# Patient Record
Sex: MALE | Race: WHITE | NOT HISPANIC OR LATINO | URBAN - METROPOLITAN AREA
[De-identification: names, ages, dates, MRNs, and addresses within clinical notes are randomized per-mention and may not be internally consistent; named-entity substitution may affect disease eponyms.]

---

## 2018-03-10 ENCOUNTER — EMERGENCY (EMERGENCY)
Facility: HOSPITAL | Age: 18
LOS: 0 days | Discharge: HOME | End: 2018-03-10
Attending: PEDIATRICS

## 2018-03-10 VITALS
TEMPERATURE: 99 F | OXYGEN SATURATION: 100 % | DIASTOLIC BLOOD PRESSURE: 67 MMHG | RESPIRATION RATE: 20 BRPM | SYSTOLIC BLOOD PRESSURE: 134 MMHG | HEART RATE: 102 BPM

## 2018-03-10 VITALS — WEIGHT: 132.28 LBS

## 2018-03-10 DIAGNOSIS — R51 HEADACHE: ICD-10-CM

## 2018-03-10 DIAGNOSIS — K04.7 PERIAPICAL ABSCESS WITHOUT SINUS: ICD-10-CM

## 2018-03-10 RX ORDER — IBUPROFEN 200 MG
600 TABLET ORAL ONCE
Qty: 0 | Refills: 0 | Status: COMPLETED | OUTPATIENT
Start: 2018-03-10 | End: 2018-03-10

## 2018-03-10 RX ORDER — AMOXICILLIN 250 MG/5ML
1 SUSPENSION, RECONSTITUTED, ORAL (ML) ORAL
Qty: 21 | Refills: 0 | OUTPATIENT
Start: 2018-03-10 | End: 2018-03-16

## 2018-03-10 RX ADMIN — Medication 600 MILLIGRAM(S): at 11:55

## 2018-03-10 NOTE — ED PROVIDER NOTE - PLAN OF CARE
pain control 18 yo male with left facial swelling, early stages of possible dental abscess. To complete abx as prescribed and f/u dental.

## 2018-03-10 NOTE — ED PROVIDER NOTE - NS ED ROS FT
Constitutional: No fever.  Pt eating and drinking well  Eyes: No discharge, erythema, pain, vision changes or edema.   ENMT: No ear pain. No Rhinorrhea. No congestion. No sore throat.   Cardiac: No CP or SOB  Respiratory: No cough, stridor, or respiratory distress. No WOB  GI: No nausea, vomiting, diarrhea or abdominal pain  Neuro: No headache or weakness. No LOC. No change in mental status   Skin: No skin rash.

## 2018-03-10 NOTE — ED PROVIDER NOTE - PROGRESS NOTE DETAILS
ATTENDING NOTE:   18 y/o M with no PMH presents to ED for evaluation of left sided dental pain that began yesterday, now worsening. No fever/chills, SOB. Denies trauma. Normal PO intake.  Physical Exam: VS reviewed. Pt is well appearing, in no distress. Answering all questions appropriately.  Sitting up in no obvious distress.  MMM, (+)Left lower gum swelling, no drainable fluid collection noted. Cap refill <2 seconds. No obvious skin rash noted. Chest with no retractions, no distress. Neuro exam grossly intact.  Will RX ABX and discharge home with outpatient dental follow up. Supportive care and return precautions advised. Pt and mother comfortable with plan.

## 2018-03-10 NOTE — ED PROVIDER NOTE - CARE PLAN
Principal Discharge DX:	Dental abscess  Goal:	pain control  Assessment and plan of treatment:	18 yo male with left facial swelling, early stages of possible dental abscess. To complete abx as prescribed and f/u dental.

## 2018-03-10 NOTE — ED PROVIDER NOTE - OBJECTIVE STATEMENT
18 yo male presents with L. facial swelling since yesterday. No associated fever. difficulty breathing, or difficulty swallowing, Reports gum feels like it is swollen. 18 yo male presents with L. facial swelling since yesterday. No associated fever. difficulty breathing, or difficulty swallowing, Reports gum feels like it is swollen. No hot/cold sensitivity. Last seen by dentist 8 months ago. Denies smoking or trauma,,

## 2018-05-11 ENCOUNTER — EMERGENCY (EMERGENCY)
Facility: HOSPITAL | Age: 18
LOS: 0 days | Discharge: HOME | End: 2018-05-12
Attending: EMERGENCY MEDICINE | Admitting: EMERGENCY MEDICINE

## 2018-05-11 VITALS
SYSTOLIC BLOOD PRESSURE: 156 MMHG | DIASTOLIC BLOOD PRESSURE: 73 MMHG | TEMPERATURE: 99 F | HEIGHT: 67 IN | RESPIRATION RATE: 20 BRPM | OXYGEN SATURATION: 100 % | HEART RATE: 100 BPM | WEIGHT: 134.92 LBS

## 2018-05-11 DIAGNOSIS — H57.8 OTHER SPECIFIED DISORDERS OF EYE AND ADNEXA: ICD-10-CM

## 2018-05-11 DIAGNOSIS — K08.89 OTHER SPECIFIED DISORDERS OF TEETH AND SUPPORTING STRUCTURES: ICD-10-CM

## 2018-05-11 DIAGNOSIS — K02.9 DENTAL CARIES, UNSPECIFIED: ICD-10-CM

## 2018-05-11 RX ORDER — DEXAMETHASONE 0.5 MG/5ML
10 ELIXIR ORAL ONCE
Qty: 0 | Refills: 0 | Status: COMPLETED | OUTPATIENT
Start: 2018-05-11 | End: 2018-05-11

## 2018-05-11 RX ORDER — IBUPROFEN 200 MG
600 TABLET ORAL ONCE
Qty: 0 | Refills: 0 | Status: COMPLETED | OUTPATIENT
Start: 2018-05-11 | End: 2018-05-11

## 2018-05-11 NOTE — ED ADULT TRIAGE NOTE - CHIEF COMPLAINT QUOTE
pt seen at urgent care today and prescribed abx, prescription wasn't ready when patient went to pharmacy so patient didn't receive abx, went home and called 911

## 2018-05-12 RX ORDER — AMOXICILLIN 250 MG/5ML
1 SUSPENSION, RECONSTITUTED, ORAL (ML) ORAL
Qty: 14 | Refills: 0 | OUTPATIENT
Start: 2018-05-12 | End: 2018-05-18

## 2018-05-12 RX ORDER — ONDANSETRON 8 MG/1
4 TABLET, FILM COATED ORAL ONCE
Qty: 0 | Refills: 0 | Status: COMPLETED | OUTPATIENT
Start: 2018-05-12 | End: 2018-05-12

## 2018-05-12 RX ADMIN — ONDANSETRON 4 MILLIGRAM(S): 8 TABLET, FILM COATED ORAL at 00:07

## 2018-05-12 RX ADMIN — Medication 10 MILLIGRAM(S): at 00:07

## 2018-05-12 RX ADMIN — Medication 600 MILLIGRAM(S): at 00:07

## 2018-05-12 NOTE — ED PROVIDER NOTE - NS ED ROS FT
Constitutional: (+) fevers/chills.    Head: No injury, headache.    Eyes:  (+) red watery eyes. No visual changes, eye pain or discharge. No injury.    ENMT:  (+) sore throat. No hearing changes, pain, discharge or infections. No neck pain or stiffness. No loss ROM.    Cardiac:  No chest pain, SOB or edema. No chest pain with exertion.    Respiratory:  No cough or respiratory distress.     GI:  No nausea, vomiting, diarrhea or abdominal pain. No anorexia. No change in PO intake.    :  No dysuria, frequency, urgency or burning. No change in urine output.    MS:  No myalgia, muscle weakness, joint pain or back pain. No loss ROM.    Neuro:  No dizziness or weakness.  No LOC.    Skin:  No skin rash.

## 2018-05-12 NOTE — ED PROVIDER NOTE - PHYSICAL EXAMINATION
GEN: Well appearing, in no apparent distress.    HEAD:  Normocephalic, atraumatic.    EYES:  Injected conjunctiva. No drainage or discharge.    ENMT:  Nasal mucosa moist; mouth moist without ulcerations or lesions; (+) tonsillar swelling L>R and erythema. No exudate, ulcerations or lesions.    NECK:  Supple, no masses. Normal ROM.    CARDIAC:  RRR, normal S1 and S2, no murmurs, rubs or gallops.    RESP:  Respiratory rate and effort appear normal; lungs are clear to auscultation bilaterally; no rhonchi, rales or wheezes.    ABDOMEN:  Soft, non-tender, non-distended, no masses. Normal BS throughout.    MUSCULOSKELETAL/NEURO:  AAO x 3. Motor 5/5. Sensory intact. Patient able to ambulate without difficulty.    SKIN:  Normal skin color for age and race, well-perfused; warm and dry.

## 2018-05-12 NOTE — ED PROVIDER NOTE - OBJECTIVE STATEMENT
16 yo male with no significant PMHx presents for sore throat. Patient was seen in urgent care and was prescribed antibiotics patient states script wasn't ready so he went home and called 911 and came here for prescription. (+) congestion, rhinorrhea, sore throat, fevers. Patient denies chest pain, SOB, abdominal pain, nausea, vomiting, diarrhea, constipation, dizziness, weakness, recent travel, changes in PO intake, changes in urine output, changes in mental status.

## 2018-05-12 NOTE — ED PROVIDER NOTE - ATTENDING CONTRIBUTION TO CARE
16 yo M presents with mother with c/o sore throat, fever and congestion.  Pt was seen at an [facility] earlier today and prescribed abx, however his prescription was not ready.  no CP, no SOB.  on exam pt in NAD AAO x3, + OP erythema with tonsillar hypertrophy L>R, uvula midline, + lad, Lungs CAT B/L, no rash

## 2018-05-13 ENCOUNTER — TRANSCRIPTION ENCOUNTER (OUTPATIENT)
Age: 18
End: 2018-05-13

## 2018-05-13 ENCOUNTER — INPATIENT (INPATIENT)
Facility: HOSPITAL | Age: 18
LOS: 0 days | Discharge: HOME | End: 2018-05-14
Attending: STUDENT IN AN ORGANIZED HEALTH CARE EDUCATION/TRAINING PROGRAM | Admitting: STUDENT IN AN ORGANIZED HEALTH CARE EDUCATION/TRAINING PROGRAM
Payer: MEDICAID

## 2018-05-13 VITALS
HEART RATE: 110 BPM | OXYGEN SATURATION: 100 % | HEIGHT: 67 IN | SYSTOLIC BLOOD PRESSURE: 130 MMHG | WEIGHT: 134.92 LBS | RESPIRATION RATE: 18 BRPM | DIASTOLIC BLOOD PRESSURE: 79 MMHG | TEMPERATURE: 99 F

## 2018-05-13 DIAGNOSIS — J36 PERITONSILLAR ABSCESS: ICD-10-CM

## 2018-05-13 LAB
ALBUMIN SERPL ELPH-MCNC: 3.7 G/DL — SIGNIFICANT CHANGE UP (ref 3.5–5.2)
ALP SERPL-CCNC: 66 U/L — SIGNIFICANT CHANGE UP (ref 30–115)
ALT FLD-CCNC: 11 U/L — LOW (ref 13–38)
ANION GAP SERPL CALC-SCNC: 12 MMOL/L — SIGNIFICANT CHANGE UP (ref 7–14)
AST SERPL-CCNC: 20 U/L — SIGNIFICANT CHANGE UP (ref 13–38)
BASOPHILS # BLD AUTO: 0.04 K/UL — SIGNIFICANT CHANGE UP (ref 0–0.2)
BASOPHILS NFR BLD AUTO: 0.2 % — SIGNIFICANT CHANGE UP (ref 0–1)
BILIRUB DIRECT SERPL-MCNC: <0.2 MG/DL — SIGNIFICANT CHANGE UP (ref 0–0.2)
BILIRUB INDIRECT FLD-MCNC: >0.4 MG/DL — SIGNIFICANT CHANGE UP (ref 0.2–1.2)
BILIRUB SERPL-MCNC: 0.6 MG/DL — SIGNIFICANT CHANGE UP (ref 0.2–1.2)
BUN SERPL-MCNC: 15 MG/DL — SIGNIFICANT CHANGE UP (ref 10–20)
CALCIUM SERPL-MCNC: 9 MG/DL — SIGNIFICANT CHANGE UP (ref 8.5–10.1)
CHLORIDE SERPL-SCNC: 103 MMOL/L — SIGNIFICANT CHANGE UP (ref 98–110)
CO2 SERPL-SCNC: 23 MMOL/L — SIGNIFICANT CHANGE UP (ref 17–32)
CREAT SERPL-MCNC: 0.9 MG/DL — SIGNIFICANT CHANGE UP (ref 0.3–1)
EOSINOPHIL # BLD AUTO: 0.02 K/UL — SIGNIFICANT CHANGE UP (ref 0–0.7)
EOSINOPHIL NFR BLD AUTO: 0.1 % — SIGNIFICANT CHANGE UP (ref 0–8)
GLUCOSE SERPL-MCNC: 109 MG/DL — HIGH (ref 70–99)
HCT VFR BLD CALC: 38.2 % — LOW (ref 42–52)
HGB BLD-MCNC: 12.5 G/DL — LOW (ref 14–18)
IMM GRANULOCYTES NFR BLD AUTO: 0.6 % — HIGH (ref 0.1–0.3)
LACTATE SERPL-SCNC: 1.1 MMOL/L — SIGNIFICANT CHANGE UP (ref 0.5–2.2)
LIDOCAIN IGE QN: 29 U/L — SIGNIFICANT CHANGE UP (ref 7–60)
LYMPHOCYTES # BLD AUTO: 1.1 K/UL — LOW (ref 1.2–3.4)
LYMPHOCYTES # BLD AUTO: 6.4 % — LOW (ref 20.5–51.1)
MCHC RBC-ENTMCNC: 27.2 PG — SIGNIFICANT CHANGE UP (ref 27–31)
MCHC RBC-ENTMCNC: 32.7 G/DL — SIGNIFICANT CHANGE UP (ref 32–37)
MCV RBC AUTO: 83.2 FL — SIGNIFICANT CHANGE UP (ref 80–94)
MONOCYTES # BLD AUTO: 1.04 K/UL — HIGH (ref 0.1–0.6)
MONOCYTES NFR BLD AUTO: 6 % — SIGNIFICANT CHANGE UP (ref 1.7–9.3)
NEUTROPHILS # BLD AUTO: 14.99 K/UL — HIGH (ref 1.4–6.5)
NEUTROPHILS NFR BLD AUTO: 86.7 % — HIGH (ref 42.2–75.2)
NRBC # BLD: 0 /100 WBCS — SIGNIFICANT CHANGE UP (ref 0–0)
PLATELET # BLD AUTO: 231 K/UL — SIGNIFICANT CHANGE UP (ref 130–400)
POTASSIUM SERPL-MCNC: 5.3 MMOL/L — HIGH (ref 3.5–5)
POTASSIUM SERPL-SCNC: 5.3 MMOL/L — HIGH (ref 3.5–5)
PROT SERPL-MCNC: 6.9 G/DL — SIGNIFICANT CHANGE UP (ref 6.1–8)
RBC # BLD: 4.59 M/UL — LOW (ref 4.7–6.1)
RBC # FLD: 13.1 % — SIGNIFICANT CHANGE UP (ref 11.5–14.5)
SODIUM SERPL-SCNC: 138 MMOL/L — SIGNIFICANT CHANGE UP (ref 135–146)
WBC # BLD: 17.3 K/UL — HIGH (ref 4.8–10.8)
WBC # FLD AUTO: 17.3 K/UL — HIGH (ref 4.8–10.8)

## 2018-05-13 RX ORDER — SODIUM CHLORIDE 9 MG/ML
1000 INJECTION, SOLUTION INTRAVENOUS
Qty: 0 | Refills: 0 | Status: DISCONTINUED | OUTPATIENT
Start: 2018-05-13 | End: 2018-05-14

## 2018-05-13 RX ORDER — SODIUM CHLORIDE 9 MG/ML
1000 INJECTION INTRAMUSCULAR; INTRAVENOUS; SUBCUTANEOUS ONCE
Qty: 0 | Refills: 0 | Status: COMPLETED | OUTPATIENT
Start: 2018-05-13 | End: 2018-05-13

## 2018-05-13 RX ORDER — SODIUM CHLORIDE 9 MG/ML
1000 INJECTION, SOLUTION INTRAVENOUS
Qty: 0 | Refills: 0 | Status: DISCONTINUED | OUTPATIENT
Start: 2018-05-13 | End: 2018-05-13

## 2018-05-13 RX ORDER — KETOROLAC TROMETHAMINE 30 MG/ML
30 SYRINGE (ML) INJECTION EVERY 6 HOURS
Qty: 0 | Refills: 0 | Status: DISCONTINUED | OUTPATIENT
Start: 2018-05-13 | End: 2018-05-14

## 2018-05-13 RX ORDER — KETOROLAC TROMETHAMINE 30 MG/ML
30 SYRINGE (ML) INJECTION EVERY 6 HOURS
Qty: 0 | Refills: 0 | Status: DISCONTINUED | OUTPATIENT
Start: 2018-05-13 | End: 2018-05-13

## 2018-05-13 RX ORDER — DEXAMETHASONE 0.5 MG/5ML
10 ELIXIR ORAL ONCE
Qty: 0 | Refills: 0 | Status: COMPLETED | OUTPATIENT
Start: 2018-05-13 | End: 2018-05-13

## 2018-05-13 RX ORDER — KETOROLAC TROMETHAMINE 30 MG/ML
30 SYRINGE (ML) INJECTION ONCE
Qty: 0 | Refills: 0 | Status: DISCONTINUED | OUTPATIENT
Start: 2018-05-13 | End: 2018-05-13

## 2018-05-13 RX ORDER — DEXAMETHASONE 0.5 MG/5ML
6 ELIXIR ORAL EVERY 12 HOURS
Qty: 0 | Refills: 0 | Status: DISCONTINUED | OUTPATIENT
Start: 2018-05-13 | End: 2018-05-14

## 2018-05-13 RX ORDER — SODIUM CHLORIDE 9 MG/ML
1000 INJECTION INTRAMUSCULAR; INTRAVENOUS; SUBCUTANEOUS
Qty: 0 | Refills: 0 | Status: DISCONTINUED | OUTPATIENT
Start: 2018-05-13 | End: 2018-05-13

## 2018-05-13 RX ORDER — SODIUM CHLORIDE 9 MG/ML
3 INJECTION INTRAMUSCULAR; INTRAVENOUS; SUBCUTANEOUS ONCE
Qty: 0 | Refills: 0 | Status: COMPLETED | OUTPATIENT
Start: 2018-05-13 | End: 2018-05-13

## 2018-05-13 RX ADMIN — Medication 66.66 MILLIGRAM(S): at 14:42

## 2018-05-13 RX ADMIN — Medication 30 MILLIGRAM(S): at 08:20

## 2018-05-13 RX ADMIN — Medication 30 MILLIGRAM(S): at 21:04

## 2018-05-13 RX ADMIN — Medication 6 MILLIGRAM(S): at 20:42

## 2018-05-13 RX ADMIN — Medication 66.66 MILLIGRAM(S): at 22:01

## 2018-05-13 RX ADMIN — Medication 30 MILLIGRAM(S): at 19:41

## 2018-05-13 RX ADMIN — Medication 10 MILLIGRAM(S): at 08:03

## 2018-05-13 RX ADMIN — SODIUM CHLORIDE 100 MILLILITER(S): 9 INJECTION, SOLUTION INTRAVENOUS at 22:01

## 2018-05-13 RX ADMIN — SODIUM CHLORIDE 3 MILLILITER(S): 9 INJECTION INTRAMUSCULAR; INTRAVENOUS; SUBCUTANEOUS at 07:45

## 2018-05-13 RX ADMIN — SODIUM CHLORIDE 125 MILLILITER(S): 9 INJECTION INTRAMUSCULAR; INTRAVENOUS; SUBCUTANEOUS at 07:46

## 2018-05-13 RX ADMIN — SODIUM CHLORIDE 1000 MILLILITER(S): 9 INJECTION INTRAMUSCULAR; INTRAVENOUS; SUBCUTANEOUS at 07:45

## 2018-05-13 RX ADMIN — Medication 30 MILLIGRAM(S): at 08:03

## 2018-05-13 NOTE — H&P PEDIATRIC - ATTENDING COMMENTS
17y M p/w sore throat, fever and odynophagia. Pt reports sore throat for 4 days, rapid strep was done at PMD office and was negative, but was treated with augmentin after returning for second visit, which he has taken for several days with no relief of symptoms. Pt had fever x 1 day that self resolved, but throat pain became severe. He has had no resp distress, no shortness of breath, no abdominal pain, rashes, he has had significant discomfort with swallowing. Pt reports he already feels much improved s/p ENT bedside drainage of 8cc purulent fluid, and after receiving fluids and  decadron . Pain now is able to take PO liquids and soft food. Pt has no prior h/o similar symptoms.    Vital Signs Last 24 Hrs  T(C): 36.1 (14 May 2018 08:14), Max: 37.1 (13 May 2018 15:30)  T(F): 96.9 (14 May 2018 08:14), Max: 98.7 (13 May 2018 15:30)  HR: 75 (14 May 2018 08:14) (75 - 90)  BP: 110/50 (14 May 2018 08:14) (104/52 - 117/56)  RR: 20 (14 May 2018 08:14) (20 - 20)  SpO2: 98% (14 May 2018 08:14) (97% - 100%)    PE: The Patient appears well , mildly uncomfortable with some hoarseness, alert and appropriate    Skin: warm and moist    Normocephalic, Perrla, sclera clear, no nasal discharge, moist mucous membranes, no sinus tenderness to palpation, post oropharynx erythematous with b/l  3+ tonsils L>R, very erythematous and with diffuses white exudates, uvula deviation toward right.    Neck supple, FROM, mobile rubbery ant cervical adenopathy of left side only    Lungs: CTA b/l, no tachypnea, no retractions, no wheeze, no crackles    Cor: RRR, S1 S2 wnl, no murmur    Abd: Soft, non tender, non distended, normal active bowel sounds, no mass, no HSM, spleen tip felt 1cm below ribcage, non tender    Ext: Warm, well perfused, moving all ext equally.                          12.5   17.30 )-----------( 231      ( 13 May 2018 09:47 )             38.2     Culture of fluid sent, pending    < from: CT Neck Soft Tissue w/ IV Cont (05.13.18 @ 08:41) >    Impression:     4.1 cm ill-defined hypodensity within the left palatine tonsil likely   represents early abscess/phlegmonous changes. No well-defined wall at   this time. Significant edema within the left submandibular and   paratracheal space as well as the piriform sinus.      Enlargement of the bilateral palatine tonsils consistent with tonsillitis.    Left cervical chain adenopathy, likely reactive.      < end of copied text >      Assessment and Plan:  Acute Pharyngitis/tonsillitis with Left peritonsilar abscess now s/p drainage and improving well. Bacterial vs viral/ mono- EBV, CMV.     -Trial of Po and discharge home later today if continues to do well today with adequate Po intake and pain controlled on oral motrin/Tylenol.    -IV clinda, transition to PO clinda upon discharge  -Encourage freq PO fluids    -f/u Monospot, EBV possibly sent from urgent care, send tests if those are not available .     -Follow up PMD and ENT as o/p

## 2018-05-13 NOTE — ED PROVIDER NOTE - PROGRESS NOTE DETAILS
CT read appreciated, d/w Dr Mccain, will see at [place]; pt will require admission for hydration/IV abx, is protecting airway and managing secretions, stable for floor

## 2018-05-13 NOTE — ED PROVIDER NOTE - NEUROLOGICAL, MLM
Alert and oriented, no focal deficits, no motor or sensory deficits. negative kerning and Brudzinski sign, neck supple

## 2018-05-13 NOTE — H&P PEDIATRIC - HEAD, EARS, EYES, NOSE AND THROAT
Left tonsillar hypertrophy with uvular deviation to the right, exam limited due to patient unable to open mouth wide.

## 2018-05-13 NOTE — CONSULT NOTE ADULT - ASSESSMENT
18 y/o male with 4.1 cm ill defined edematous area of the L palatine tonsil.  - IV Clindmycin  - Decadron 6mg Q12   - pain control  - IV fluid hydration  - possible drainage of abscess once trismus resolves  - d/w Dr. Pineda

## 2018-05-13 NOTE — CONSULT NOTE ADULT - SUBJECTIVE AND OBJECTIVE BOX
ENT DAILY PROGRESS NOTE    Overnight events/Interval HPI: HPI:  18 yo M w/no pmhx presents with throat pain and swelling, admitted for tonsilitis with concern for abscess formation.    Symptoms started 4 days ago with mild throat pain and swelling. Went to urgent care the next day, received ibuprofen,  and they did a throat culture. Later on that day pain got worse, started spiking fevers so went to [place] ED, where they gave him a steroid shot and started him on Augmentin. Following day, swelling and pain were progressively worse with trouble speaking, and was not taking anything PO resulting in decreased urine output so came to the ED.  Denies any trouble breathing, nausea, vomiting, diarrhea, neck pain, chest pain, shortness of breath,  or abdominal pain. (13 May 2018 15:49)      Seen and examined at bedside. Pt feeling well denies any pain but states he still is having trouble tolerating PO. Feels much better since receiving a dose of steroids. Has had voice changes. No fevers tmax 99, No N/V. Has trismus, tolerating secretions, no drooling, no trouble breathing/SOB      Allergies    No Known Allergies    Intolerances        MEDICATIONS:  Antiinfectives:   clindamycin IV Intermittent - Peds 600 milliGRAM(s) IV Intermittent every 8 hours    IV fluids:  dextrose 5% + sodium chloride 0.9%. - Pediatric 1000 milliLiter(s) IV Continuous <Continuous>    Hematologic/Anticoagulation:    Pain medications/Neuro:  ketorolac   Injectable 30 milliGRAM(s) IV Push every 6 hours PRN    Endocrine Medications:   dexamethasone  Injectable 6 milliGRAM(s) IV Push every 12 hours    All other standing medications:     All other PRN medications:      Vital Signs Last 24 Hrs  T(C): 37.1 (13 May 2018 15:30), Max: 37.3 (13 May 2018 06:51)  T(F): 98.7 (13 May 2018 15:30), Max: 99.2 (13 May 2018 06:51)  HR: 86 (13 May 2018 15:30) (78 - 110)  BP: 104/52 (13 May 2018 15:30) (104/52 - 130/79)  BP(mean): --  RR: 20 (13 May 2018 15:30) (16 - 20)  SpO2: 97% (13 May 2018 15:30) (97% - 100%)      05-13 @ 07:01  -  05-13 @ 17:41  --------------------------------------------------------  IN:    dextrose 5% + sodium chloride 0.9%. - Pediatric: 400 mL    Oral Fluid: 80 mL  Total IN: 480 mL    OUT:  Total OUT: 0 mL    Total NET: 480 mL            PHYSICAL EXAM:    ENT EXAM-   Constitutional: Well-developed, well-nourished.  + hoarseness.     Head:  normocephalic, atraumatic.   Nose:  No discharge, no bleed  OC/OP:  + trismus, mucosa moist, + L palatine swelling, + slight deviation of uvula to the R, No FOB tenderness, No active drainage,        LABS:  CBC-                        12.5   17.30 )-----------( 231      ( 13 May 2018 09:47 )             38.2     BMP/CMP-  13 May 2018 09:47    138    |  103    |  15     ----------------------------<  109    5.3     |  23     |  0.9      Ca    9.0        13 May 2018 09:47    TPro  6.9    /  Alb  3.7    /  TBili  0.6    /  DBili  <0.2   /  AST  20     /  ALT  11     /  AlkPhos  66     13 May 2018 09:47    Coagulation Studies-    Endocrine Panel-  Calcium, Total Serum: 9.0 mg/dL (05-13 @ 09:47)              RADIOLOGY & ADDITIONAL STUDIES:  < from: CT Neck Soft Tissue w/ IV Cont (05.13.18 @ 08:41) >    EXAM:  CT NECK SOFT TISSUE IC            PROCEDURE DATE:  05/13/2018            INTERPRETATION:  Clinical History / Reason for exam: Sore throat.    Technique: Multiple contiguous axial images were obtained of the soft   tissues of the neck from the superior aspect of the frontal sinuses   through to the carmen after the intravenous administration of  100 cc of   Optiray 320.    Comparison: None available.    Findings:     There is enlargement of the bilateral palatine tonsils, left greater than   right. The left palatine tonsil contains ill-defined hypodensity   measuring approximately 2.1 x 2.1 x 4.1 cm. Enlarged left cervical chain   lymph nodes measuring up to 3.7 x 1.9 cm (series 602/53).    Adjacent edema is seen within the left submandibular and paratracheal   space. There is edema extending down the left side of the oropharynx to   the left aryepiglottic fold and perform sinus. No retropharyngeal edema   is seen.    The remainder of the upper aerodigestive system is unremarkable.    Visualized portions of the brain demonstrate no abnormal enhancing masses   or lesions. The paranasal sinuses and bilateral mastoid air complexes are   clear.      Globes and lenses are intact.      The thyroid gland is symmetric and enhances homogeneously.  The great   vessels of the neck are unremarkable. There is no cervical   lymphadenopathy.  The bones of the cervical spine are unremarkable.      Bilateral upper lungs are clear.      Impression:     4.1 cm ill-defined hypodensity within the left palatine tonsil likely   represents early abscess/phlegmonous changes. No well-defined wall at   this time. Significant edema within the left submandibular and   paratracheal space as well as the piriform sinus.    Enlargement of the bilateral palatine tonsils consistent with tonsillitis.    Left cervical chain adenopathy, likely reactive.              ZINA SUE M.D., RESIDENT RADIOLOGIST  This document has been electronically signed.  JARRELL POSADAS M.D., ATTENDING RADIOLOGIST  This document has been electronically signed. May 13 2018  9:16AM                < end of copied text >

## 2018-05-13 NOTE — PROGRESS NOTE ADULT - SUBJECTIVE AND OBJECTIVE BOX
pt s/p needle aspiration of left peritonsilar abscess    pt is a 17 year old male admitted for left side peritonsillar abscess    after 12 hours of decadron, trismus diminished left peritonsilar region grossly distended wit uvula deviated to right    after discussion pt willing to allow needle aspiration    a single stick of 18 gauge needle aspirated 6-7 cc of white pus    pus sent to lab for c/s    pt tolerated aspiration with out issue

## 2018-05-13 NOTE — ED ADULT TRIAGE NOTE - CHIEF COMPLAINT QUOTE
Per mom, "He can't eat or drink for 3 days because his throat feels like it's closed up.  He needs fluids.  I think he's dehydrated"  Was treated in ED day before and was given a steroid and is now on antibiotics.

## 2018-05-13 NOTE — H&P PEDIATRIC - HISTORY OF PRESENT ILLNESS
18 yo M w/no pmhx presents with throat pain and swelling, admitted for tonsilitis with concern for abscess formation.    Symptoms started 4 days ago with mild throat pain and swelling. Went to urgent care the next day, received ibuprofen,  and they did a throat culture. Later on that day pain got worse, started spiking fevers so went to [place] ED, where they gave him a steroid shot and started him on Augmentin. Following day, swelling and pain were progressively worse with trouble speaking, and was not taking anything PO resulting in decreased urine output so came to the ED.  Denies any trouble breathing, nausea, vomiting, diarrhea, neck pain, chest pain, shortness of breath,  or abdominal pain.

## 2018-05-13 NOTE — ED PROVIDER NOTE - ATTENDING CONTRIBUTION TO CARE
I personally evaluated the patient. I reviewed the Resident’s or Physician Assistant’s note (as assigned above), and agree with the findings and plan except as documented in my note.  17y male immunized with worsening sore throat/unable to swallow, no sob, on exam vital signs appreciated, nontoxic, voice sl muffled, no hoarseness, no drooling, +mild trismus with edema and to left soft palate and tonsil with uvular deviation, +exudate, +tender left upper cervical deanna, imaging reviewied, will admit for IV abx, hydration, ENT eval

## 2018-05-13 NOTE — H&P PEDIATRIC - PROBLEM SELECTOR PLAN 1
- IV Clindamycin  - IV Decadron Q12H  - IV Toradol PRN pain  - Full liquid diet  - Maintenance IV fluids  - ENT may attempt drainage, will follow up

## 2018-05-13 NOTE — H&P PEDIATRIC - NSHPSOCIALHISTORY_GEN_ALL_CORE
Lives at home with mom, mom's boyfriend and brother. Graduated high school, is working at [employer] improvement, no smoking, drinks couple beers or a glass of wine on occasion, no drugs, is sexually active with girlfriend and uses condoms consistently, no history of STDs. No anxiety, sadness, depression.

## 2018-05-14 ENCOUNTER — TRANSCRIPTION ENCOUNTER (OUTPATIENT)
Age: 18
End: 2018-05-14

## 2018-05-14 VITALS — TEMPERATURE: 98 F

## 2018-05-14 PROBLEM — J30.2 OTHER SEASONAL ALLERGIC RHINITIS: Chronic | Status: ACTIVE | Noted: 2018-05-13

## 2018-05-14 PROBLEM — Z00.00 ENCOUNTER FOR PREVENTIVE HEALTH EXAMINATION: Status: ACTIVE | Noted: 2018-05-14

## 2018-05-14 PROCEDURE — 99222 1ST HOSP IP/OBS MODERATE 55: CPT

## 2018-05-14 RX ORDER — IBUPROFEN 200 MG
1 TABLET ORAL
Qty: 56 | Refills: 0 | OUTPATIENT
Start: 2018-05-14 | End: 2018-05-27

## 2018-05-14 RX ADMIN — Medication 6 MILLIGRAM(S): at 07:46

## 2018-05-14 RX ADMIN — Medication 66.66 MILLIGRAM(S): at 06:19

## 2018-05-14 NOTE — DISCHARGE NOTE PEDIATRIC - ADDITIONAL INSTRUCTIONS
F/U PMD in 2-3 days post discharge, please call for appointment. If he develops worsening pain, swelling, inability to turn his neck, inability to swallow, difficulty breathing, fever, or any new or worsening symptoms then please seek medical attention. F/U ENT within 1 week post discharge, please call for appointment. F/U dentist as soon as possible. If he develops worsening pain, swelling, inability to turn his neck, inability to swallow, difficulty breathing, fever, or any new or worsening symptoms then please seek medical attention.

## 2018-05-14 NOTE — PROGRESS NOTE ADULT - SUBJECTIVE AND OBJECTIVE BOX
Pt is a 17 y.o male with L tonsillar/PTA, s/p drainage at bedside - seen and examined, doing well. PT is tolerating PO diet this AM, feeling overall improved. Pt still c/o pain and difficulty with swallowing, but much better after being drained. Pt denies SOB/diff breathing.    MEDICATIONS  (STANDING):  clindamycin IV Intermittent - Peds 600 milliGRAM(s) IV Intermittent every 8 hours  dextrose 5% + sodium chloride 0.9%. - Pediatric 1000 milliLiter(s) (100 mL/Hr) IV Continuous <Continuous>    Vital Signs: T(F): 96.9 (14 May 2018 08:14), Max: 98.7 (13 May 2018 15:30), HR: 75, BP: 110/50, RR: 20, SpO2: 98%  GEN: NAD, awake and alert  HEENT: NC/AT, oral mucosa pink, + erythema/edema to L tonsil, uvula midline.                          12.5   17.30 )-----------( 231      ( 13 May 2018 09:47 )             38.2

## 2018-05-14 NOTE — DISCHARGE NOTE PEDIATRIC - MEDICATION SUMMARY - MEDICATIONS TO TAKE
I will START or STAY ON the medications listed below when I get home from the hospital:    ibuprofen 400 mg oral tablet  -- 1 tab(s) by mouth every 6 hours MDD:Take every 6 hours as needed for pain or fever.  -- Do not take this drug if you are pregnant.  It is very important that you take or use this exactly as directed.  Do not skip doses or discontinue unless directed by your doctor.  May cause drowsiness or dizziness.  Obtain medical advice before taking any non-prescription drugs as some may affect the action of this medication.  Take with food or milk.    -- Indication: For Mild pain    clindamycin 300 mg oral capsule  -- 1 cap(s) by mouth every 8 hours   -- Finish all this medication unless otherwise directed by prescriber.  Medication should be taken with plenty of water.    -- Indication: For PERITONSILLAR ABSCESS I will START or STAY ON the medications listed below when I get home from the hospital:    ibuprofen 400 mg oral tablet  -- 1 tab(s) by mouth every 6 hours MDD:Take every 6 hours as needed for pain or fever.  -- Do not take this drug if you are pregnant.  It is very important that you take or use this exactly as directed.  Do not skip doses or discontinue unless directed by your doctor.  May cause drowsiness or dizziness.  Obtain medical advice before taking any non-prescription drugs as some may affect the action of this medication.  Take with food or milk.    -- Indication: For PERITONSILLAR ABSCESS    clindamycin 300 mg oral capsule  -- 1 cap(s) by mouth every 8 hours   -- Finish all this medication unless otherwise directed by prescriber.  Medication should be taken with plenty of water.    -- Indication: For PERITONSILLAR ABSCESS

## 2018-05-14 NOTE — DISCHARGE NOTE PEDIATRIC - CARE PLAN
Principal Discharge DX:	Peritonsillar abscess  Goal:	Resolution of pain and swelling, ability to take adequate PO  Assessment and plan of treatment:	- Continue clindamycin for a total of 10 days  - Tylenol/Motrin as needed  - F/U PMD in 2-3 days post discharge, please call for appointment Principal Discharge DX:	Peritonsillar abscess  Goal:	Resolution of pain and swelling, ability to take adequate PO  Assessment and plan of treatment:	- Continue clindamycin for 8 more days  - Tylenol/Motrin as needed  - F/U ENT within 1 week after discharge, please call for appointment, information provided below  Secondary Diagnosis:	Dental infection  Goal:	Dentist follow up  Assessment and plan of treatment:	- Please follow up with dentist as soon as possible.

## 2018-05-14 NOTE — PROGRESS NOTE ADULT - ASSESSMENT
17 y.o male with L PTA/tonsillitis.    ·	D/C steroids  ·	cont abx, switch to PO when ready for D/C  ·	cont soft diet  ·	cont IVF  ·	dr marrufo at bedside

## 2018-05-14 NOTE — DISCHARGE NOTE PEDIATRIC - OTHER SIGNIFICANT FINDINGS
Nucleated RBC (05.13.18 @ 09:47)    Nucleated RBC: 0 /100 WBCs    Lipase, Serum (05.13.18 @ 09:47)    Lipase, Serum: 29 U/L    Lactate, Blood (05.13.18 @ 09:47)    Lactate, Blood: 1.1 mmol/L    Complete Blood Count + Automated Diff (05.13.18 @ 09:47)    WBC Count: 17.30 K/uL    RBC Count: 4.59 M/uL    Hemoglobin: 12.5 g/dL    Hematocrit: 38.2 %    Mean Cell Volume: 83.2 fL    Mean Cell Hemoglobin: 27.2 pg    Mean Cell Hemoglobin Conc: 32.7 g/dL    Red Cell Distrib Width: 13.1 %    Platelet Count - Automated: 231 K/uL    Auto Neutrophil #: 14.99 K/uL    Auto Lymphocyte #: 1.10 K/uL    Auto Monocyte #: 1.04 K/uL    Auto Eosinophil #: 0.02 K/uL    Auto Basophil #: 0.04 K/uL    Auto Neutrophil %: 86.7: Differential percentages must be correlated with absolute numbers for  clinical significance. %    Auto Lymphocyte %: 6.4 %    Auto Monocyte %: 6.0 %    Auto Eosinophil %: 0.1 %    Auto Basophil %: 0.2 %    Auto Immature Granulocyte %: 0.6 %    Hepatic Function Panel (05.13.18 @ 09:47)    Indirect Reacting Bilirubin: >0.4 mg/dL    Protein Total, Serum: 6.9 g/dL    Albumin, Serum: 3.7 g/dL    Bilirubin Total, Serum: 0.6 mg/dL    Bilirubin Direct, Serum: <0.2: Hemolyzed. Interpret with caution mg/dL    Alkaline Phosphatase, Serum: 66 U/L    Aspartate Aminotransferase (AST/SGOT): 20: Hemolyzed. Interpret with caution U/L    Alanine Aminotransferase (ALT/SGPT): 11: Hemolyzed. Interpret with caution U/L    Basic Metabolic Panel (05.13.18 @ 09:47)    Sodium, Serum: 138 mmol/L    Potassium, Serum: 5.3: Hemolyzed. Interpret with caution mmol/L    Chloride, Serum: 103 mmol/L    Carbon Dioxide, Serum: 23 mmol/L    Anion Gap, Serum: 12 mmol/L    Blood Urea Nitrogen, Serum: 15 mg/dL    Creatinine, Serum: 0.9 mg/dL    Glucose, Serum: 109 mg/dL    Calcium, Total Serum: 9.0 mg/dL    eGFR if Non : 123: Interpretative comment  The units for eGFR are ml/min/1.73m2 (normalized body surface area). The  eGFR is calculated from a serum creatinine using the CKD-EPI equation.  Other variables required for calculation are race, age and sex. Among  patients with chronic kidney disease (CKD), the eGFR is useful in  determining the stage of disease according to KDOQI CKD classification.  All eGFR results are reported numerically with the following  interpretation.          GFR                    With                 Without     (ml/min/1.73 m2)    Kidney Damage       Kidney Damage        >= 90                    Stage 1                     Normal        60-89                    Stage 2                     Decreased GFR        30-59     Stage 3                     Stage 3        15-29                    Stage 4                     Stage 4        < 15                      Stage 5                     Stage 5  Each stage of CKD assumes that the associated GFR level has been in  effect for at least 3 months. Determination of stages one and two (with  eGFR > 59 ml/min/m2) requires estimation of kidney damage for at least 3  months as defined by structural or functional abnormalities.  Limitations: All estimates of GFR will be less accurate for patients at  extremes of muscle mass (including but not limited to frail elderly,  critically ill, or cancer patients), those with unusual diets, and those  with conditions associated with reduced secretion or extrarenal  elimination of creatinine. The eGFR equation is not recommended for use  in patients with unstable creatinine levels. mL/min/1.73M2    eGFR if African American: 143 mL/min/1.73M2

## 2018-05-14 NOTE — DISCHARGE NOTE PEDIATRIC - PATIENT PORTAL LINK FT
You can access the [facility], offered by [facility], by registering with the following website: [email or web address]

## 2018-05-14 NOTE — DISCHARGE NOTE PEDIATRIC - PLAN OF CARE
Resolution of pain and swelling, ability to take adequate PO - Continue clindamycin for a total of 10 days  - Tylenol/Motrin as needed  - F/U PMD in 2-3 days post discharge, please call for appointment - Continue clindamycin for 8 more days  - Tylenol/Motrin as needed  - F/U ENT within 1 week after discharge, please call for appointment, information provided below Dentist follow up - Please follow up with dentist as soon as possible.

## 2018-05-14 NOTE — DISCHARGE NOTE PEDIATRIC - HOSPITAL COURSE
18 yo M w/no pmhx presents with throat pain and swelling, admitted for tonsilitis with concern for abscess formation.    Symptoms started 4 days ago with mild throat pain and swelling. Went to urgent care the next day, received ibuprofen,  and they did a throat culture. Later on that day pain got worse, started spiking fevers so went to [place] ED, where they gave him a steroid shot and started him on Augmentin. Following day, swelling and pain were progressively worse with trouble speaking, and was not taking anything PO resulting in decreased urine output so came to the ED. Does have an infected wisdom tooth on the left side.  Denies any trouble breathing, nausea, vomiting, diarrhea, neck pain, chest pain, shortness of breath,  or abdominal pain.    All other histories noncontributory.    PE: Left tonsillar hypertrophy with uvular deviation towards right side, unable to fully open mouth.    ED: T 99.2F, , /79, RR 18, O2 100%. CBC, CMP, CT Neck soft tissue. Dexamethasone x 1, Toradol x 1, NS Bolus x 1    Hospital course: Patient admitted to the floor, started on IV clindamycin, IV decadron and Toradol as needed. ENT drained 8cc of purulent fluid from the left tonsil, after which pain and swelling have improved. He is tolerating PO, and is stable for discharge home on oral medication. 16 yo M w/no pmhx presents with throat pain and swelling, admitted for tonsilitis with concern for abscess formation.    Symptoms started 4 days ago with mild throat pain and swelling. Went to urgent care the next day, received ibuprofen,  and they did a throat culture. Later on that day pain got worse, started spiking fevers so went to [place] ED, where they gave him a steroid shot and started him on Augmentin. Following day, swelling and pain were progressively worse with trouble speaking, and was not taking anything PO resulting in decreased urine output so came to the ED. Does have an infected wisdom tooth on the left side.  Denies any trouble breathing, nausea, vomiting, diarrhea, neck pain, chest pain, shortness of breath,  or abdominal pain.    All other histories noncontributory.    PE: Left tonsillar hypertrophy with uvular deviation towards right side, unable to fully open mouth.    ED: T 99.2F, , /79, RR 18, O2 100%. CBC, CMP, CT Neck soft tissue. Dexamethasone x 1, Toradol x 1, NS Bolus x 1    Hospital course: Patient admitted to the floor, started on IV clindamycin, IV decadron and Toradol as needed. ENT drained 8cc of purulent fluid from the left tonsil, after which pain and swelling have improved. He is tolerating PO, and is stable for discharge home on oral clindamycin and ibuprofen. Monospot and EBV serology sent, will follow up and call if positive.

## 2018-05-14 NOTE — DISCHARGE NOTE PEDIATRIC - CARE PROVIDER_API CALL
Johana Lucero (DO), Family Medicine  [address] [place] [address]  Phone: [phone]  Fax: [phone] Johana Lucero (DO), Family Medicine  [address] [place] [id number]  Phone: [phone]  Fax: [phone]    Dariel Pineda), Otolaryngology  [address] [place] [address]  Phone: [phone]  Fax: [phone]

## 2018-05-14 NOTE — DISCHARGE NOTE PEDIATRIC - CARE PROVIDERS DIRECT ADDRESSES
,DirectAddress_Unknown ,DirectAddress_Unknown,[email or web address]

## 2018-05-15 LAB
CULTURE RESULTS: SIGNIFICANT CHANGE UP
EBV EA AB SER IA-ACNC: <5 U/ML — SIGNIFICANT CHANGE UP
EBV EA AB TITR SER IF: NEGATIVE — SIGNIFICANT CHANGE UP
EBV EA IGG SER-ACNC: NEGATIVE — SIGNIFICANT CHANGE UP
EBV NA IGG SER IA-ACNC: <3 U/ML — SIGNIFICANT CHANGE UP
EBV PATRN SPEC IB-IMP: SIGNIFICANT CHANGE UP
EBV VCA IGG AVIDITY SER QL IA: NEGATIVE — SIGNIFICANT CHANGE UP
EBV VCA IGM SER IA-ACNC: 13.3 U/ML — SIGNIFICANT CHANGE UP
EBV VCA IGM SER IA-ACNC: <10 U/ML — SIGNIFICANT CHANGE UP
EBV VCA IGM TITR FLD: NEGATIVE — SIGNIFICANT CHANGE UP
HETEROPH AB TITR SER AGGL: NEGATIVE — SIGNIFICANT CHANGE UP
SPECIMEN SOURCE: SIGNIFICANT CHANGE UP

## 2018-05-17 ENCOUNTER — APPOINTMENT (OUTPATIENT)
Dept: OTOLARYNGOLOGY | Facility: CLINIC | Age: 18
End: 2018-05-17
Payer: MEDICAID

## 2018-05-17 VITALS
DIASTOLIC BLOOD PRESSURE: 70 MMHG | BODY MASS INDEX: 21.19 KG/M2 | WEIGHT: 135 LBS | HEIGHT: 67 IN | SYSTOLIC BLOOD PRESSURE: 120 MMHG

## 2018-05-17 DIAGNOSIS — Z87.09 PERSONAL HISTORY OF OTHER DISEASES OF THE RESPIRATORY SYSTEM: ICD-10-CM

## 2018-05-17 DIAGNOSIS — Z78.9 OTHER SPECIFIED HEALTH STATUS: ICD-10-CM

## 2018-05-17 PROCEDURE — 99212 OFFICE O/P EST SF 10 MIN: CPT

## 2018-05-17 RX ORDER — CLINDAMYCIN HYDROCHLORIDE 300 MG/1
CAPSULE ORAL
Refills: 0 | Status: ACTIVE | COMMUNITY

## 2018-05-19 DIAGNOSIS — J03.90 ACUTE TONSILLITIS, UNSPECIFIED: ICD-10-CM

## 2018-05-19 DIAGNOSIS — K04.7 PERIAPICAL ABSCESS WITHOUT SINUS: ICD-10-CM

## 2018-05-19 LAB
CULTURE RESULTS: SIGNIFICANT CHANGE UP
SPECIMEN SOURCE: SIGNIFICANT CHANGE UP

## 2018-06-17 NOTE — CHART NOTE - NSCHARTNOTEFT_GEN_A_CORE
18 y/o male with h/o dental carries presents with throat swelling x 4days. Pt reports feeling as if throat was closing, evaluated by Urgent Care 3 days ago who gave Motrin, rapid strep negative and sent home. Symptoms worsened that evening so went to [facility] ED Past Medical History:   Diagnosis Date    Diabetes mellitus     Hypertension        Past Surgical History:   Procedure Laterality Date    CHOLECYSTECTOMY      HERNIA REPAIR         Review of patient's allergies indicates:  No Known Allergies    Current Facility-Administered Medications on File Prior to Encounter   Medication    [COMPLETED] GI cocktail (mylanta 30 mL, lidocaine 2 % viscous 10 mL, dicyclomine 10 mL) 50 mL    [DISCONTINUED] amLODIPine tablet 10 mg    [DISCONTINUED] atropine 1 mg/ml injection 0.5 mg    [DISCONTINUED] hydroCHLOROthiazide tablet 25 mg    [DISCONTINUED] hydrOXYzine HCl tablet 25 mg    [DISCONTINUED] ketorolac tablet 10 mg    [DISCONTINUED] lisinopril tablet 40 mg    [DISCONTINUED] magnesium sulfate 3 g in sodium chloride 0.9% 250 mL IVPB    [DISCONTINUED] meclizine tablet 12.5 mg    [DISCONTINUED] ondansetron injection 4 mg    [DISCONTINUED] pantoprazole EC tablet 40 mg     Current Outpatient Prescriptions on File Prior to Encounter   Medication Sig    amlodipine (NORVASC) 10 MG tablet Take 1 tablet (10 mg total) by mouth once daily.    hydrochlorothiazide (HYDRODIURIL) 25 MG tablet     hydrOXYzine (ATARAX) 25 MG tablet Take 25 mg by mouth 3 (three) times daily as needed for Itching.    lisinopril (PRINIVIL,ZESTRIL) 40 MG tablet Take 40 mg by mouth once daily.     Family History     None        Social History Main Topics    Smoking status: Never Smoker    Smokeless tobacco: Never Used    Alcohol use No    Drug use: No    Sexual activity: No     Review of Systems   All other systems reviewed and are negative.    Objective:     Vital Signs (Most Recent):  Temp: 98 °F (36.7 °C) (06/17/18 1216)  Pulse: (!) 50 (06/17/18 1216)  Resp: 16 (06/17/18 1216)  BP: (!) 126/58 (06/17/18 1216)  SpO2: 95 % (06/17/18 1216) Vital Signs (24h Range):  Temp:  [97.9 °F (36.6 °C)-98.3 °F (36.8 °C)] 98 °F (36.7 °C)  Pulse:  [40-59] 50  Resp:  [12-20] 16  SpO2:  [91 %-98 %] 95 %  BP:  (114-177)/(58-86) 126/58     Weight: 71.2 kg (156 lb 14.4 oz)  Body mass index is 35.18 kg/m².    SpO2: 95 %  O2 Device (Oxygen Therapy): room air      Intake/Output Summary (Last 24 hours) at 06/17/18 1216  Last data filed at 06/17/18 0742   Gross per 24 hour   Intake              240 ml   Output             1201 ml   Net             -961 ml       Lines/Drains/Airways     Peripheral Intravenous Line                 Peripheral IV - Single Lumen 06/15/18 1951 Right Antecubital 1 day                Physical Exam   Constitutional: She is oriented to person, place, and time. She appears well-developed and well-nourished.   HENT:   Head: Normocephalic and atraumatic.   Eyes: No scleral icterus.   Cardiovascular: Regular rhythm.    Bradycardia   Systolic murmur at the left sternal border    Pulmonary/Chest: Effort normal and breath sounds normal. No respiratory distress. She has no wheezes. She has no rales.   Musculoskeletal: She exhibits no edema.   Neurological: She is alert and oriented to person, place, and time.   Skin: Skin is warm.   Psychiatric: She has a normal mood and affect.       Significant Labs: All pertinent lab results from the last 24 hours have been reviewed.    Significant Imaging: EKG: As in HPI   16 y/o male with h/o dental infection to L. wisdom tooth presents with throat swelling x 4days. Pt reports feeling as if throat was closing, evaluated by Urgent Care 3 days ago who gave Motrin, rapid strep negative and sent home. Symptoms worsened that evening so went to [facility] ED where he was given Decadron and started on Augmentin. Symptoms improved the following day, however on day of presentation, patient reports worsening throat swelling with change in voice, difficulty tolerating feeds and secondary weakness and headaches. No SOB, drooling, or stridor. No known trauma. Given decadron, NS bolus, and started on maintenance IVF in [facility] ED. ENT consulted, recommending admission for IV abx, steroids, and possible drainage.   Pt is otherwise well. Has seasonal allergies. No pmhx or surgeries. Denies smoking or drug use.       Constitutional: No fever.  decreased PO  Eyes: No discharge, erythema, pain, vision changes or edema.   ENMT: No ear pain. No Rhinorrhea. No congestion. +sore throat.   Cardiac: No CP or SOB  Respiratory: No cough, stridor, or respiratory distress. No WOB  GI: No nausea, vomiting, diarrhea or abdominal pain  : No foul smelling urine. No dysuria. Decreased UO.   MS: No muscle weakness, myalgia, joint pain, back pain  Neuro: +headache +weakness. No LOC. No change in mental status   Skin: No skin rash.    GEN: well-appearing, NAD  Head normocephalic, atraumatic. PERRL. No eye discharge. conjunctiva and sclera clear. No nasal congestion/discharge. MMM. Posterior pharyngeal exam limited secondary to patient being unable to open mouth completely. Notable uvula deviation to the right with left tonsillar hypertrophy. Neck supple, + cervical adenopathy. Heart: S1S2 RRR. Lungs- CTA B/L, good air entry. Abdomen soft ntnd +BS. Extremities- moves all normally, sensation wnl, no cyanosis Skin: warm and dry, no acute rash. cap refill < 2sec    CBC Full  -  ( 13 May 2018 09:47 )  WBC Count : 17.30 K/uL  Hemoglobin : 12.5 g/dL  Hematocrit : 38.2 %  Platelet Count - Automated : 231 K/uL  Mean Cell Volume : 83.2 fL  Mean Cell Hemoglobin : 27.2 pg  Mean Cell Hemoglobin Concentration : 32.7 g/dL  Auto Neutrophil # : 14.99 K/uL  Auto Lymphocyte # : 1.10 K/uL  Auto Monocyte # : 1.04 K/uL  Auto Eosinophil # : 0.02 K/uL  Auto Basophil # : 0.04 K/uL  Auto Neutrophil % : 86.7 %  Auto Lymphocyte % : 6.4 %  Auto Monocyte % : 6.0 %  Auto Eosinophil % : 0.1 %  Auto Basophil % : 0.2 %    05-13    138  |  103  |  15  ----------------------------<  109<H>  5.3<H>   |  23  |  0.9    Ca    9.0      13 May 2018 09:47    TPro  6.9  /  Alb  3.7  /  TBili  0.6  /  DBili  <0.2  /  AST  20  /  ALT  11<L>  /  AlkPhos  66  05-13    Lipase, Serum: 29 U/L (05.13.18 @ 09:47)  Lactate, Blood: 1.1 mmol/L (05.13.18 @ 09:47)  < from: CT Neck Soft Tissue w/ IV Cont (05.13.18 @ 08:41) >    4.1 cm ill-defined hypodensity within the left palatine tonsil likely   represents early abscess/phlegmonous changes. No well-defined wall at   this time. Significant edema within the left submandibular and   paratracheal space as well as the piriform sinus.    Enlargement of the bilateral palatine tonsils consistent with tonsillitis.    Left cervical chain adenopathy, likely reactive.    A/P: 16 y/o male presents with pharyngeal edema with resultant decreased PO likely in the setting of tonsillitis with early abscess formation.  - IV clindamycin  - Decadron Q12   - Toradol IV PRN pain  - MIVF  - Regular diet as tolerated  - Appreciate ENT recommendations- will continue to follow for possible I&D 16 y/o male with h/o dental infection to L. wisdom tooth presents with throat swelling x 4days. Pt reports feeling as if throat was closing, evaluated by Urgent Care 3 days ago who gave Motrin, rapid strep negative and sent home. Symptoms worsened that evening so went to [facility] ED where he was given Decadron and started on Augmentin. Symptoms improved the following day, however on day of presentation, patient reports worsening throat swelling with change in voice, difficulty tolerating feeds and secondary weakness and headaches. No SOB, drooling, or stridor. No known trauma. Given decadron, NS bolus, and started on maintenance IVF in [facility] ED. ENT consulted, recommending admission for IV abx, steroids, and possible drainage.   Pt is otherwise well. Has seasonal allergies. No pmhx or surgeries. Denies smoking or drug use.       Constitutional: No fever.  decreased PO  Eyes: No discharge, erythema, pain, vision changes or edema.   ENMT: No ear pain. No Rhinorrhea. No congestion. +sore throat.   Cardiac: No CP or SOB  Respiratory: No cough, stridor, or respiratory distress. No WOB  GI: No nausea, vomiting, diarrhea or abdominal pain  : No foul smelling urine. No dysuria. Decreased UO.   MS: No muscle weakness, myalgia, joint pain, back pain  Neuro: +headache +weakness. No LOC. No change in mental status   Skin: No skin rash.    GEN: well-appearing, NAD  Head normocephalic, atraumatic. PERRL. No eye discharge. conjunctiva and sclera clear. No nasal congestion/discharge. MMM. Posterior pharyngeal exam limited secondary to patient being unable to open mouth completely. Notable uvula deviation to the right with left tonsillar hypertrophy. Neck supple, + cervical adenopathy. Heart: S1S2 RRR. Lungs- CTA B/L, good air entry. Abdomen soft ntnd +BS. Extremities- moves all normally, sensation wnl, no cyanosis Skin: warm and dry, no acute rash. cap refill < 2sec    CBC Full  -  ( 13 May 2018 09:47 )  WBC Count : 17.30 K/uL  Hemoglobin : 12.5 g/dL  Hematocrit : 38.2 %  Platelet Count - Automated : 231 K/uL  Mean Cell Volume : 83.2 fL  Mean Cell Hemoglobin : 27.2 pg  Mean Cell Hemoglobin Concentration : 32.7 g/dL  Auto Neutrophil # : 14.99 K/uL  Auto Lymphocyte # : 1.10 K/uL  Auto Monocyte # : 1.04 K/uL  Auto Eosinophil # : 0.02 K/uL  Auto Basophil # : 0.04 K/uL  Auto Neutrophil % : 86.7 %  Auto Lymphocyte % : 6.4 %  Auto Monocyte % : 6.0 %  Auto Eosinophil % : 0.1 %  Auto Basophil % : 0.2 %    05-13    138  |  103  |  15  ----------------------------<  109<H>  5.3<H>   |  23  |  0.9    Ca    9.0      13 May 2018 09:47    TPro  6.9  /  Alb  3.7  /  TBili  0.6  /  DBili  <0.2  /  AST  20  /  ALT  11<L>  /  AlkPhos  66  05-13    Lipase, Serum: 29 U/L (05.13.18 @ 09:47)  Lactate, Blood: 1.1 mmol/L (05.13.18 @ 09:47)  < from: CT Neck Soft Tissue w/ IV Cont (05.13.18 @ 08:41) >    4.1 cm ill-defined hypodensity within the left palatine tonsil likely   represents early abscess/phlegmonous changes. No well-defined wall at   this time. Significant edema within the left submandibular and   paratracheal space as well as the piriform sinus.    Enlargement of the bilateral palatine tonsils consistent with tonsillitis.    Left cervical chain adenopathy, likely reactive.    A/P: 16 y/o male presents with pharyngeal edema with resultant decreased PO likely in the setting of tonsillitis with early abscess formation.  - IV clindamycin  - Decadron Q12   - Toradol IV PRN pain  - f/u throat culture   - MIVF  - Regular diet as tolerated  - Appreciate ENT recommendations- will continue to follow for possible I&D 18 y/o male with h/o dental infection to L. wisdom tooth presents with throat swelling x 4days. Pt reports feeling as if throat was closing, evaluated by Urgent Care 3 days ago who gave Motrin, rapid strep negative and sent home. Symptoms worsened that evening so went to [facility] ED where he was given Decadron and started on Augmentin. Symptoms improved the following day, however on day of presentation, patient reports worsening throat swelling with change in voice, difficulty tolerating feeds and secondary weakness and headaches. No SOB, drooling, or stridor. No known trauma. Given decadron, NS bolus, and started on maintenance IVF in [facility] ED. ENT consulted, recommending admission for IV abx, steroids, and possible drainage.   Pt is otherwise well. Has seasonal allergies. No pmhx or surgeries. Denies smoking or drug use.       Constitutional: No fever.  decreased PO  Eyes: No discharge, erythema, pain, vision changes or edema.   ENMT: No ear pain. No Rhinorrhea. No congestion. +sore throat.   Cardiac: No CP or SOB  Respiratory: No cough, stridor, or respiratory distress. No WOB  GI: No nausea, vomiting, diarrhea or abdominal pain  : No foul smelling urine. No dysuria. Decreased UO.   MS: No muscle weakness, myalgia, joint pain, back pain  Neuro: +headache +weakness. No LOC. No change in mental status   Skin: No skin rash.    GEN: well-appearing, NAD  Head normocephalic, atraumatic. PERRL. No eye discharge. conjunctiva and sclera clear. No nasal congestion/discharge. MMM. Posterior pharyngeal exam limited secondary to patient being unable to open mouth completely. Notable uvula deviation to the right with left tonsillar hypertrophy. Neck supple, + cervical adenopathy. Heart: S1S2 RRR. Lungs- CTA B/L, good air entry. Abdomen soft ntnd +BS. Extremities- moves all normally, sensation wnl, no cyanosis Skin: warm and dry, no acute rash. cap refill < 2sec    CBC Full  -  ( 13 May 2018 09:47 )  WBC Count : 17.30 K/uL  Hemoglobin : 12.5 g/dL  Hematocrit : 38.2 %  Platelet Count - Automated : 231 K/uL  Mean Cell Volume : 83.2 fL  Mean Cell Hemoglobin : 27.2 pg  Mean Cell Hemoglobin Concentration : 32.7 g/dL  Auto Neutrophil # : 14.99 K/uL  Auto Lymphocyte # : 1.10 K/uL  Auto Monocyte # : 1.04 K/uL  Auto Eosinophil # : 0.02 K/uL  Auto Basophil # : 0.04 K/uL  Auto Neutrophil % : 86.7 %  Auto Lymphocyte % : 6.4 %  Auto Monocyte % : 6.0 %  Auto Eosinophil % : 0.1 %  Auto Basophil % : 0.2 %    05-13    138  |  103  |  15  ----------------------------<  109<H>  5.3<H>   |  23  |  0.9    Ca    9.0      13 May 2018 09:47    TPro  6.9  /  Alb  3.7  /  TBili  0.6  /  DBili  <0.2  /  AST  20  /  ALT  11<L>  /  AlkPhos  66  05-13    Lipase, Serum: 29 U/L (05.13.18 @ 09:47)  Lactate, Blood: 1.1 mmol/L (05.13.18 @ 09:47)  < from: CT Neck Soft Tissue w/ IV Cont (05.13.18 @ 08:41) >    4.1 cm ill-defined hypodensity within the left palatine tonsil likely   represents early abscess/phlegmonous changes. No well-defined wall at   this time. Significant edema within the left submandibular and   paratracheal space as well as the piriform sinus.    Enlargement of the bilateral palatine tonsils consistent with tonsillitis.    Left cervical chain adenopathy, likely reactive.    A/P: 18 y/o male presents with pharyngeal edema with resultant decreased PO likely in the setting of tonsillitis with early abscess formation.  - IV clindamycin  - Decadron Q12   - Toradol IV PRN pain  - f/u throat culture   - MIVF  - Regular diet as tolerated  - Appreciate ENT recommendations- will continue to follow for possible I&D  - dental follow-up as outpatient 18 y/o male with h/o dental infection to L. wisdom tooth presents with throat swelling x 4days. Pt reports feeling as if throat was closing, evaluated by Urgent Care 3 days ago who gave Motrin, rapid strep negative, mono sent, and sent home. Symptoms worsened that evening so went to [facility] ED where he was given Decadron and started on Augmentin. Symptoms improved the following day, however on day of presentation, patient reports worsening throat swelling with change in voice, difficulty tolerating feeds and secondary weakness and headaches. No SOB, drooling, or stridor. No abdominal pain. No known trauma. Given decadron, NS bolus, and started on maintenance IVF in [facility] ED. ENT consulted, recommending admission for IV abx, steroids, and possible drainage.   Pt is otherwise well. Has seasonal allergies. No pmhx or surgeries. Denies smoking or drug use.       Constitutional: No fever.  decreased PO  Eyes: No discharge, erythema, pain, vision changes or edema.   ENMT: No ear pain. No Rhinorrhea. No congestion. +sore throat.   Cardiac: No CP or SOB  Respiratory: No cough, stridor, or respiratory distress. No WOB  GI: No nausea, vomiting, diarrhea or abdominal pain  : No foul smelling urine. No dysuria. Decreased UO.   MS: No muscle weakness, myalgia, joint pain, back pain  Neuro: +headache +weakness. No LOC. No change in mental status   Skin: No skin rash.    GEN: well-appearing, NAD  Head normocephalic, atraumatic. PERRL. No eye discharge. conjunctiva and sclera clear. No nasal congestion/discharge. MMM. Posterior pharyngeal exam limited secondary to patient being unable to open mouth completely. Notable uvula deviation to the right with left tonsillar hypertrophy. Neck supple, + cervical adenopathy. Heart: S1S2 RRR. Lungs- CTA B/L, good air entry. Abdomen soft ntnd +BS. Extremities- moves all normally, sensation wnl, no cyanosis Skin: warm and dry, no acute rash. cap refill < 2sec    CBC Full  -  ( 13 May 2018 09:47 )  WBC Count : 17.30 K/uL  Hemoglobin : 12.5 g/dL  Hematocrit : 38.2 %  Platelet Count - Automated : 231 K/uL  Mean Cell Volume : 83.2 fL  Mean Cell Hemoglobin : 27.2 pg  Mean Cell Hemoglobin Concentration : 32.7 g/dL  Auto Neutrophil # : 14.99 K/uL  Auto Lymphocyte # : 1.10 K/uL  Auto Monocyte # : 1.04 K/uL  Auto Eosinophil # : 0.02 K/uL  Auto Basophil # : 0.04 K/uL  Auto Neutrophil % : 86.7 %  Auto Lymphocyte % : 6.4 %  Auto Monocyte % : 6.0 %  Auto Eosinophil % : 0.1 %  Auto Basophil % : 0.2 %    05-13    138  |  103  |  15  ----------------------------<  109<H>  5.3<H>   |  23  |  0.9    Ca    9.0      13 May 2018 09:47    TPro  6.9  /  Alb  3.7  /  TBili  0.6  /  DBili  <0.2  /  AST  20  /  ALT  11<L>  /  AlkPhos  66  05-13    Lipase, Serum: 29 U/L (05.13.18 @ 09:47)  Lactate, Blood: 1.1 mmol/L (05.13.18 @ 09:47)  < from: CT Neck Soft Tissue w/ IV Cont (05.13.18 @ 08:41) >    4.1 cm ill-defined hypodensity within the left palatine tonsil likely   represents early abscess/phlegmonous changes. No well-defined wall at   this time. Significant edema within the left submandibular and   paratracheal space as well as the piriform sinus.    Enlargement of the bilateral palatine tonsils consistent with tonsillitis.    Left cervical chain adenopathy, likely reactive.    A/P: 18 y/o male presents with pharyngeal edema with resultant decreased PO likely in the setting of tonsillitis with early abscess formation.  - IV clindamycin  - Decadron Q12   - Toradol IV PRN pain  - f/u throat culture   - may consider repeat EBV/monospot   - MIVF  - Regular diet as tolerated  - Appreciate ENT recommendations- will continue to follow for possible I&D  - dental follow-up as outpatient

## 2018-06-26 ENCOUNTER — TRANSCRIPTION ENCOUNTER (OUTPATIENT)
Age: 18
End: 2018-06-26

## 2019-01-21 ENCOUNTER — TRANSCRIPTION ENCOUNTER (OUTPATIENT)
Age: 19
End: 2019-01-21

## 2019-02-25 NOTE — ED PROVIDER NOTE - OBJECTIVE STATEMENT
17 year old male no past medical history states that he was in emergency room 5/10 told had dental infection, on 5/11 presented again to emergency room for sore throat placed on Augmentin and states that he is back again today stating swelling is worse, hard time eating and drinking with change in voice. no trouble breathing no fever/chills. no nausea, vomiting, diarrhea. no neck pain. no chest pain, no shortness of breath, no abdominal pain. ADMIT

## 2019-04-09 NOTE — ED PROVIDER NOTE - ATTESTATION, MLM
Teresita Grossman is a 73 year old female presenting for   Chief Complaint   Patient presents with   • Medicare Wellness Visit     Medicare Wellness visit w/follow up     Denies Latex allergy or sensitivity.    Medication verified, no changes  Refills needed today: Yes    Health Maintenance Due   Topic Date Due   • Shingles Vaccine (1 of 2) 02/15/1994   • Medicare Wellness 65+  04/04/2019   • Depression Screening  04/04/2019       Patient is due for the topics as listed above and wishes to proceed with them. Patient has an advanced directive under chart review/media tab          6 b.) How many servings of High Fiber / Whole Grain Foods to you have each day ( 1 serving = 1 cup cold cereal, 1/2 cup cooked cereal, 1 slice bread):  1 per day     14.) During the past 4 weeks, was someone available to help if you needed and wanted help?:  No, not at all          I have reviewed and confirmed nurses' notes for patient's medications, allergies, medical history, and surgical history.

## 2019-05-11 ENCOUNTER — EMERGENCY (EMERGENCY)
Facility: HOSPITAL | Age: 19
LOS: 0 days | Discharge: HOME | End: 2019-05-11
Attending: EMERGENCY MEDICINE | Admitting: EMERGENCY MEDICINE
Payer: COMMERCIAL

## 2019-05-11 VITALS
HEART RATE: 78 BPM | TEMPERATURE: 99 F | SYSTOLIC BLOOD PRESSURE: 119 MMHG | DIASTOLIC BLOOD PRESSURE: 67 MMHG | OXYGEN SATURATION: 98 % | RESPIRATION RATE: 18 BRPM

## 2019-05-11 VITALS — WEIGHT: 132.28 LBS

## 2019-05-11 DIAGNOSIS — Z79.2 LONG TERM (CURRENT) USE OF ANTIBIOTICS: ICD-10-CM

## 2019-05-11 DIAGNOSIS — Z79.1 LONG TERM (CURRENT) USE OF NON-STEROIDAL ANTI-INFLAMMATORIES (NSAID): ICD-10-CM

## 2019-05-11 DIAGNOSIS — R07.0 PAIN IN THROAT: ICD-10-CM

## 2019-05-11 PROCEDURE — 99282 EMERGENCY DEPT VISIT SF MDM: CPT

## 2019-05-11 NOTE — ED PROVIDER NOTE - PROVIDER TOKENS
PROVIDER:[TOKEN:[[id number]]],PROVIDER:[TOKEN:[[id number]]],PROVIDER:[TOKEN:[[id number]]]

## 2019-05-11 NOTE — ED PROVIDER NOTE - CLINICAL SUMMARY MEDICAL DECISION MAKING FREE TEXT BOX
a/p; Chronic sore throat, will dc home w ent f/u 1-2 weeks for further work up/eval, no signs of PTA, pharyngitis, doubt RPA, epiglottitis, bacterial tracheitis from H&P.

## 2019-05-11 NOTE — ED PROVIDER NOTE - OBJECTIVE STATEMENT
19 yo male with no PMH present with throat discomfort for the past year. Patient had a peritonsillar abscess 1 year ago with was aspirated here in the ED. Since then patient complains that it has bothered him. He denies fever, difficulty swallowing, difficulty breathing. He is otherwise at baseline behavior.

## 2019-05-11 NOTE — ED PROVIDER NOTE - CARE PROVIDERS DIRECT ADDRESSES
[email or web address]

## 2019-05-11 NOTE — ED PROVIDER NOTE - CARE PROVIDER_API CALL
Nayana Benson (MD)  [facility]  [address] [place] [address]  Phone: [phone]  Fax: [phone]  Follow Up Time:     Jeanette Estevez)  Otolaryngology  [address] [place] [address]  Phone: [phone]  Fax: [phone]  Follow Up Time:     Dariel Pineda)  Otolaryngology  [address] [place] [address]  Phone: [phone]  Fax: [phone]  Follow Up Time:

## 2019-05-11 NOTE — ED PROVIDER NOTE - ATTENDING CONTRIBUTION TO CARE
18M no pmh, imms utd, p/w 1 year of sore throat. ever since PTA drained 1 yr ago has had intermittent discomfort. followed w ent briefly after PTA but stopped and cant remember who he saw. has seen many doctors over the past year for sore throat and always told it looks fine and to f/u w ent which he has not done. no fever. no trouble swallowing. tolerating po. no sob. no trismus. no trouble speaking. no runny nose, cough, neck pain. no cp, sob.     on exam, AFVSS, well cynthia nad, ncat, eomi, perrla, mmm, op clear no erythema or exudates, neck supple nontender no lad, FROM no stiffness, lctab, rrr nl s1s2 no mrg, abd soft ntnd, aaox3, no focal deficits, no le edema or calf ttp,     a/p; Chronic sore throat, will dc home w ent f/u 1-2 weeks for further work up/eval, no signs of PTA, pharyngitis, doubt RPA, epiglottitis, bacterial tracheitis from H&P.

## 2019-05-11 NOTE — ED PROVIDER NOTE - CARE PLAN
Principal Discharge DX:	Throat pain in adult  Goal:	Follow up  Assessment and plan of treatment:	Follow up with ENT for further evaluation

## 2019-05-11 NOTE — ED PEDIATRIC NURSE NOTE - OBJECTIVE STATEMENT
patient c/o discomfort in his throat x 2 days, patient has a hx of an abscess in his throat, unsure of where exactly

## 2019-05-11 NOTE — ED PROVIDER NOTE - PHYSICAL EXAMINATION
General: well appearing, in no distress  HEENT: eyes PERRLA, throat non erythematous, no tonsillar enlargement or exudate, neck supple w/ FROM and no adenopathy  CVS: S1, S2 no murmurs  RESP: CTAB/L no wheezes, rhonchi or rales  Neuro: Awake, alert and appropriate for age

## 2019-05-11 NOTE — ED PROVIDER NOTE - NSFOLLOWUPINSTRUCTIONS_ED_ALL_ED_FT
Follow up with ENT for further evaluation     Pharyngitis    Pharyngitis is inflammation of your pharynx, which is typically caused by a viral or bacterial infection. Pharyngitis can be contagious and may spread from person to person through intimate contact, coughing, sneezing, or sharing personal items and utensils. Symptoms of pharyngitis may include sore throat, fever, headache, or swollen lymph nodes. If you are prescribed antibiotics, make sure you finish them even if you start to feel better. Gargle with salt water as often as every 1-2 hours to soothe your throat. Throat lozenges (if you are not at risk for choking) or sprays may be used to soothe your throat.    SEEK IMMEDIATE MEDICAL CARE IF YOU HAVE ANY OF THE FOLLOWING SYMPTOMS: neck stiffness, drooling, hoarseness or change in voice, inability to swallow liquids, vomiting, or trouble breathing.

## 2019-05-13 ENCOUNTER — APPOINTMENT (OUTPATIENT)
Dept: OTOLARYNGOLOGY | Facility: CLINIC | Age: 19
End: 2019-05-13
Payer: COMMERCIAL

## 2019-05-13 DIAGNOSIS — R22.1 LOCALIZED SWELLING, MASS AND LUMP, NECK: ICD-10-CM

## 2019-05-13 DIAGNOSIS — R07.0 PAIN IN THROAT: ICD-10-CM

## 2019-05-13 PROCEDURE — 31575 DIAGNOSTIC LARYNGOSCOPY: CPT

## 2019-05-13 PROCEDURE — 99213 OFFICE O/P EST LOW 20 MIN: CPT | Mod: 25

## 2019-05-13 RX ORDER — CLINDAMYCIN HYDROCHLORIDE 300 MG/1
300 CAPSULE ORAL EVERY 6 HOURS
Qty: 40 | Refills: 2 | Status: ACTIVE | COMMUNITY
Start: 2019-05-13 | End: 1900-01-01

## 2019-05-13 RX ORDER — CEFUROXIME AXETIL 500 MG/1
500 TABLET ORAL
Qty: 20 | Refills: 0 | Status: ACTIVE | COMMUNITY
Start: 2019-03-19

## 2019-05-13 NOTE — PHYSICAL EXAM
[Midline] : trachea located in midline position [Normal] : normal appearance, well groomed, well nourished, and in no acute distress [de-identified] : edematous

## 2019-05-13 NOTE — PROCEDURE
[Oxymetazoline HCl] : oxymetazoline HCl [Topical Lidocaine] : topical lidocaine [Flexible Endoscope] : examined with the flexible endoscope [Normal] : posterior cricoid area had healthy pink mucosa in the interarytenoid area and the esophageal inlet [de-identified] : chronic throat pain

## 2019-05-13 NOTE — HISTORY OF PRESENT ILLNESS
[FreeTextEntry1] : Patient here today c/o throat pain. Patient admits he feels his throat is constantly uncomfortable. Pain started getting worse about 2 days ago. He states last year he had an abscess drained. Recently seen in ER. No dysphagia. He just states his throat is uncomfortable. Pt is feeling 4/10 sharp pain radiating to the ear. Pt has been given abx and steroids multiple times this year.  [Neck Pain] : neck pain [Otalgia] : otalgia [Difficulty Swallowing] : no difficulty swallowing [Painful Swallowing] : no painful swallowing

## 2020-07-14 ENCOUNTER — TRANSCRIPTION ENCOUNTER (OUTPATIENT)
Age: 20
End: 2020-07-14

## 2020-12-16 PROBLEM — Z87.09 HISTORY OF PHARYNGITIS: Status: RESOLVED | Noted: 2018-05-17 | Resolved: 2020-12-16

## 2021-05-21 ENCOUNTER — EMERGENCY (EMERGENCY)
Facility: HOSPITAL | Age: 21
LOS: 0 days | Discharge: HOME | End: 2021-05-21
Attending: EMERGENCY MEDICINE | Admitting: EMERGENCY MEDICINE
Payer: COMMERCIAL

## 2021-05-21 VITALS
HEART RATE: 96 BPM | SYSTOLIC BLOOD PRESSURE: 136 MMHG | RESPIRATION RATE: 18 BRPM | OXYGEN SATURATION: 99 % | DIASTOLIC BLOOD PRESSURE: 76 MMHG | TEMPERATURE: 97 F

## 2021-05-21 VITALS
HEIGHT: 67 IN | SYSTOLIC BLOOD PRESSURE: 142 MMHG | OXYGEN SATURATION: 98 % | HEART RATE: 102 BPM | RESPIRATION RATE: 20 BRPM | WEIGHT: 139.99 LBS | TEMPERATURE: 97 F | DIASTOLIC BLOOD PRESSURE: 87 MMHG

## 2021-05-21 DIAGNOSIS — Z23 ENCOUNTER FOR IMMUNIZATION: ICD-10-CM
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
## 2021-05-21 DIAGNOSIS — S69.91XA UNSPECIFIED INJURY OF RIGHT WRIST, HAND AND FINGER(S), INITIAL ENCOUNTER: ICD-10-CM

## 2021-05-21 DIAGNOSIS — S62.630A DISPLACED FRACTURE OF DISTAL PHALANX OF RIGHT INDEX FINGER, INITIAL ENCOUNTER FOR CLOSED FRACTURE: ICD-10-CM
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
## 2021-05-21 DIAGNOSIS — S61.210A LACERATION WITHOUT FOREIGN BODY OF RIGHT INDEX FINGER WITHOUT DAMAGE TO NAIL, INITIAL ENCOUNTER: ICD-10-CM

## 2021-05-21 PROCEDURE — 99284 EMERGENCY DEPT VISIT MOD MDM: CPT

## 2021-05-21 PROCEDURE — 73130 X-RAY EXAM OF HAND: CPT | Mod: 26,RT

## 2021-05-21 RX ORDER — TETANUS TOXOID, REDUCED DIPHTHERIA TOXOID AND ACELLULAR PERTUSSIS VACCINE, ADSORBED 5; 2.5; 8; 8; 2.5 [IU]/.5ML; [IU]/.5ML; UG/.5ML; UG/.5ML; UG/.5ML
0.5 SUSPENSION INTRAMUSCULAR ONCE
Refills: 0 | Status: COMPLETED | OUTPATIENT
Start: 2021-05-21 | End: 2021-05-21

## 2021-05-21 RX ORDER — KETOROLAC TROMETHAMINE 30 MG/ML
30 SYRINGE (ML) INJECTION ONCE
Refills: 0 | Status: DISCONTINUED | OUTPATIENT
Start: 2021-05-21 | End: 2021-05-21

## 2021-05-21 RX ORDER — AMPICILLIN SODIUM AND SULBACTAM SODIUM 250; 125 MG/ML; MG/ML
3 INJECTION, POWDER, FOR SUSPENSION INTRAMUSCULAR; INTRAVENOUS ONCE
Refills: 0 | Status: COMPLETED | OUTPATIENT
Start: 2021-05-21 | End: 2021-05-21

## 2021-05-21 RX ORDER — ONDANSETRON 8 MG/1
4 TABLET, FILM COATED ORAL ONCE
Refills: 0 | Status: COMPLETED | OUTPATIENT
Start: 2021-05-21 | End: 2021-05-21

## 2021-05-21 RX ORDER — ACETAMINOPHEN 500 MG
650 TABLET ORAL ONCE
Refills: 0 | Status: COMPLETED | OUTPATIENT
Start: 2021-05-21 | End: 2021-05-21

## 2021-05-21 RX ORDER — OXYCODONE AND ACETAMINOPHEN 5; 325 MG/1; MG/1
1 TABLET ORAL EVERY 4 HOURS
Refills: 0 | Status: DISCONTINUED | OUTPATIENT
Start: 2021-05-21 | End: 2021-05-21

## 2021-05-21 RX ADMIN — Medication 30 MILLIGRAM(S): at 11:55

## 2021-05-21 RX ADMIN — ONDANSETRON 4 MILLIGRAM(S): 8 TABLET, FILM COATED ORAL at 10:37

## 2021-05-21 RX ADMIN — OXYCODONE AND ACETAMINOPHEN 1 TABLET(S): 5; 325 TABLET ORAL at 11:55

## 2021-05-21 RX ADMIN — TETANUS TOXOID, REDUCED DIPHTHERIA TOXOID AND ACELLULAR PERTUSSIS VACCINE, ADSORBED 0.5 MILLILITER(S): 5; 2.5; 8; 8; 2.5 SUSPENSION INTRAMUSCULAR at 12:00

## 2021-05-21 RX ADMIN — Medication 650 MILLIGRAM(S): at 11:18

## 2021-05-21 RX ADMIN — AMPICILLIN SODIUM AND SULBACTAM SODIUM 200 GRAM(S): 250; 125 INJECTION, POWDER, FOR SUSPENSION INTRAMUSCULAR; INTRAVENOUS at 10:30

## 2021-05-21 NOTE — CONSULT NOTE ADULT - SUBJECTIVE AND OBJECTIVE BOX
YEE ALVAREZ [id number]  20y Male    HPI:  20M RHD s/p R 2nd digit crush injury. Accident occurred at 8:30am today at work. 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 Pt w/ lacerations/avulsion injuries over middle and distal phalanxes as well as displaced fracture.     PAST MEDICAL & SURGICAL HISTORY:  Seasonal allergies  No significant past surgical history    MEDICATIONS  (PRN):  oxycodone    5 mG/acetaminophen 325 mG 1 Tablet(s) Oral every 4 hours PRN Moderate Pain (4 - 6)    REVIEW OF SYSTEMS  [X] A ten-point review of systems was otherwise negative except as noted.  [ ] Due to altered mental status/intubation, subjective information were not able to be obtained from the patient. History was obtained, to the extent possible, from review of the chart and collateral sources of information.    Vital Signs Last 24 Hrs  T(C): 36.1 (21 May 2021 12:04), Max: 36.1 (21 May 2021 09:52)  T(F): 97 (21 May 2021 12:04), Max: 97 (21 May 2021 09:52)  HR: 96 (21 May 2021 12:04) (96 - 102)  BP: 136/76 (21 May 2021 12:04) (136/76 - 142/87)  RR: 18 (21 May 2021 12:04) (18 - 20)  SpO2: 99% (21 May 2021 12:04) (98% - 99%)    PHYSICAL EXAM:  GENERAL: NAD  RUE: avulsion injury over lateral middle phalanx and dorsal distal phalanx  - sensation and motor intact, limited by pain  - 2+ radial and ulnar pulses    RADIOLOGY & ADDITIONAL STUDIES:  < from: Xray Hand 3 Views, Right (05.21.21 @ 10:32) >  FINDINGS:    There is an acute displaced fracture of the second distal phalanx with apex volar angulation. Approximately 2 mm displacement of the distal fracture fragment is noted. An additional ossific fragment overlies the second middle phalanx. No additional fractures are identified.    The joint spaces are unremarkable.    IMPRESSION:  Acute displaced and angulated fracture of the second distal phalanx.  < end of copied text >

## 2021-05-21 NOTE — ED PROVIDER NOTE - PATIENT PORTAL LINK FT
You can access the FollowMyHealth Patient Portal offered by [facility] by registering at the following website: [email or web address]. By joining [facility]’s FollowMyHealth portal, you will also be able to view your health information using other applications (apps) compatible with our system.

## 2021-05-21 NOTE — ED PROVIDER NOTE - ATTENDING CONTRIBUTION TO CARE
19 y/o RHD male presents to ED with right 2nd digit pain and degloving s/p crush injury while working.  No other injuries.  No numbness or tingling.  PE:  agree with above.  A/P:  Crush Injury to 2nd finger right hand.  R/O fracture.  Imaging, Meds and Hand Consult.

## 2021-05-21 NOTE — ED ADULT NURSE NOTE - SUICIDE SCREENING QUESTION 1
Rupal is here for back issues    Pre-Visit Screening :  Immunizations : up to date  Colonoscopy : is up to date  Mammogram : is up to date  Asthma Action Test/Plan : NA  PHQ9/GAD7 :  NA  Pulse - regular  My Chart - accepts    CLASSIFICATION OF OVERWEIGHT AND OBESITY BY BMI                         Obesity Class           BMI(kg/m2)  Underweight                                    < 18.5  Normal                                         18.5-24.9  Overweight                                     25.0-29.9  OBESITY                     I                  30.0-34.9                              II                 35.0-39.9  EXTREME OBESITY             III                >40                             Patient's  BMI Body mass index is 44.33 kg/(m^2).  http://hin.nhlbi.nih.gov/menuplanner/menu.cgi  Questioned patient about current smoking habits.  Pt. quit smoking some time ago.                       No

## 2021-05-21 NOTE — ED ADULT NURSE NOTE - OBJECTIVE STATEMENT
21 y/o male c/o of right hand 2ng digit pain and laceration. Patient states a stack of wood fell on his hand at work today aprox 1 hour ago.

## 2021-05-21 NOTE — CONSULT NOTE ADULT - ASSESSMENT
20M RHD w/ R 2nd digit avulsion injury and displaced and angulated fracture of distal phalanx.    PLAN:   recommend:   - tetanus  - 3g unasyn  - bacitracin to open areas, R 2nd finger in metal splint, extension  - DC home on augmentin  - FU in Dr. Herron's office on Monday, call to schedule an appointment

## 2021-05-21 NOTE — ED PROVIDER NOTE - NSFOLLOWUPINSTRUCTIONS_ED_ALL_ED_FT
Please keep your finger in the splint, straight, and dry. Please follow up with Dr. Herron on Monday for your finger fracture. You can take ibuprofen 600 mg every 6 hours as needed for pain. Please take antibiotics 2x a day for the next 7 days.

## 2021-05-21 NOTE — ED PROVIDER NOTE - PHYSICAL EXAMINATION
Vital Signs: I have reviewed the initial vital signs.  Constitutional: well-nourished, appears stated age, no acute distress.  HEENT: Airway patent, moist MM, no erythema/swelling/deformity of oral structures. EOMI, PERRLA.  CV: regular rate, regular rhythm, well-perfused extremities, 2+ b/l DP and radial pulses equal.  Lungs: no increased WOB.  MSK: right index finger demonstrates laceration to the thenar side of the finger with degloving of the nail, unable to flex the distal tip of the finger, sensation intact.   INTEG: Skin warm, dry, no rash.  NEURO: A&Ox3, moving all extremities, normal speech  PSYCH: Calm, cooperative, normal affect and interaction.

## 2021-05-21 NOTE — ED PROVIDER NOTE - PROGRESS NOTE DETAILS
hand surgery consulted, they will come down to see pt. discussed with hand surgery, patient to be given unasyn 3g, augmentin bid at home, splinted in aluminum splint in extension, to follow-up with Dr. boggs on Monday, no laceration repair for today.  Patient to be discharged from ED. Any available test results were discussed with patient and/or family. Verbal instructions given, including instructions to return to ED immediately for any new, worsening, or concerning symptoms. Patient endorsed understanding. Written discharge instructions additionally given, including follow-up plan.

## 2021-05-21 NOTE — ED PROVIDER NOTE - OBJECTIVE STATEMENT
20M presents with right hand injury. 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 Patient was able to extricate his hand immediately, but now has severe pain in the tip of his finger. UTD with vaccinations, no head injury/loc.

## 2021-05-21 NOTE — ED ADULT NURSE NOTE - NS ED NURSE DC PT EDUCATION RESOURCES
Gudelia Lawson : 1953 MRN: [id number] DATE: 2018    DIAGNOSIS: 8 week follow up right total knee      SUBJECTIVE:Patient returns today for 8 week follow up of right total knee replacement. Patient reports doing well with no unusual complaints. Appears to be progressing appropriately.    OBJECTIVE:   Exam:. The incision is well healed. No sign of infection. Range of motion is measured at 0 to 120. The calf is soft and nontender with a negative Homans sign. Strength is progressing and the patient is ambulating appropriately.    DIAGNOSTIC STUDIES  Xrays: 3 views of the right knee (AP, lateral, and sunrise) were ordered and reviewed for evaluation of recent knee replacement. They demonstrate a well positioned, well aligned knee replacement without complicating factors noted. In comparison with previous films there has been no change.    ASSESSMENT: 8 week status post right knee replacement.    PLAN: 1) Continue with PT exercises as prescribed   2) Follow up in 10 months    Carmela Mcnulty, APRN  2018         Yes

## 2021-05-21 NOTE — ED PROVIDER NOTE - CARE PROVIDER_API CALL
Cliff Herron  PLASTIC SURGERY  [address] [place] [address]  Phone: [phone]  Fax: [phone]  Scheduled Appointment: 05/24/2021

## 2021-05-21 NOTE — ED PROVIDER NOTE - CLINICAL SUMMARY MEDICAL DECISION MAKING FREE TEXT BOX
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  Unasyn given.  Seen and evaluated by Hand Surgery team.  Bacitracin applied to open areas, placed in metal splint in extension.  Patient to follow up with Dr. Herron in the office on MONDAY.  Strict return instructions discussed with mom and patient.  Rx sent to pharmacy. 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  Unasyn given. Tetanus updated. Seen and evaluated by Hand Surgery team.  Bacitracin applied to open areas, placed in metal splint in extension.  Patient to follow up with Dr. Herron in the office on MONDAY.  Strict return instructions discussed with mom and patient.  Rx sent to pharmacy.

## 2021-08-17 ENCOUNTER — OUTPATIENT (OUTPATIENT)
Dept: OUTPATIENT SERVICES | Facility: HOSPITAL | Age: 21
LOS: 1 days | Discharge: HOME | End: 2021-08-17

## 2021-08-17 DIAGNOSIS — S62.521A DISPLACED FRACTURE OF DISTAL PHALANX OF RIGHT THUMB, INITIAL ENCOUNTER FOR CLOSED FRACTURE: ICD-10-CM

## 2021-09-24 DIAGNOSIS — S62.521D DISPLACED FRACTURE OF DISTAL PHALANX OF RIGHT THUMB, SUBSEQUENT ENCOUNTER FOR FRACTURE WITH ROUTINE HEALING: ICD-10-CM

## 2021-10-28 ENCOUNTER — OUTPATIENT (OUTPATIENT)
Dept: OUTPATIENT SERVICES | Facility: HOSPITAL | Age: 21
LOS: 1 days | Discharge: HOME | End: 2021-10-28

## 2021-10-28 DIAGNOSIS — S62.521A DISPLACED FRACTURE OF DISTAL PHALANX OF RIGHT THUMB, INITIAL ENCOUNTER FOR CLOSED FRACTURE: ICD-10-CM

## 2022-02-18 NOTE — ED ADULT NURSE NOTE - CCCP TRG CHIEF CMPLNT
Insurance Authorization for admission:   Phone call placed to [facility]  Phone number: [phone]  Spoke to [place]  14 days approved  Level of care: Inpatient  Review on 0303/22     Authorization # [id number] lacerations

## 2022-07-27 NOTE — ED PROVIDER NOTE - NSTIMEPROVIDERCAREINITIATE_GEN_ER
LVM
10-Mar-2018 11:33

## 2022-08-16 ENCOUNTER — APPOINTMENT (OUTPATIENT)
Dept: PAIN MANAGEMENT | Facility: CLINIC | Age: 22
End: 2022-08-16

## 2023-04-11 NOTE — ED ADULT NURSE NOTE - NSIMPLEMENTINTERV_GEN_ALL_ED
Implemented All Universal Safety Interventions:  [place] to call system. Call bell, personal items and telephone within reach. Instruct patient to call for assistance. Room bathroom lighting operational. Non-slip footwear when patient is off stretcher. Physically safe environment: no spills, clutter or unnecessary equipment. Stretcher in lowest position, wheels locked, appropriate side rails in place. Attending Attestation (For Attendings USE Only)...

## 2023-07-21 ENCOUNTER — APPOINTMENT (OUTPATIENT)
Dept: CARDIOLOGY | Facility: CLINIC | Age: 23
End: 2023-07-21

## 2024-05-07 NOTE — ED ADULT TRIAGE NOTE - BSA (M2)
May 7, 2024         Patient: Rosalio Kingston   YOB: 2020   Date of Visit: 5/7/2024           To Whom it May Concern:    Rosalio Kingston was seen in my clinic on 5/7/2024. She may be excused from school today, she may return to school when she has been on new antibiotic drops for 24 hours.    If you have any questions or concerns, please don't hesitate to call.        Sincerely,           CHRISTIANO Reese.  Electronically Signed     
1.71

## 2025-03-10 ENCOUNTER — EMERGENCY (EMERGENCY)
Facility: HOSPITAL | Age: 25
LOS: 1 days | Discharge: HOME | End: 2025-03-10
Attending: EMERGENCY MEDICINE | Admitting: EMERGENCY MEDICINE
Payer: COMMERCIAL

## 2025-03-10 VITALS
SYSTOLIC BLOOD PRESSURE: 137 MMHG | WEIGHT: 139.99 LBS | OXYGEN SATURATION: 100 % | TEMPERATURE: 98 F | HEIGHT: 67 IN | HEART RATE: 100 BPM | RESPIRATION RATE: 16 BRPM | DIASTOLIC BLOOD PRESSURE: 89 MMHG

## 2025-03-10 PROCEDURE — 99283 EMERGENCY DEPT VISIT LOW MDM: CPT

## 2025-03-10 PROCEDURE — 99053 MED SERV 10PM-8AM 24 HR FAC: CPT
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
## 2025-03-10 NOTE — ED ADULT NURSE NOTE - NSFALLUNIVINTERV_ED_ALL_ED
Bed/Stretcher in lowest position, wheels locked, appropriate side rails in place/Call bell, personal items and telephone in reach/Instruct patient to call for assistance before getting out of bed/chair/stretcher/Non-slip footwear applied when patient is off stretcher/[place] to call system/Physically safe environment - no spills, clutter or unnecessary equipment/Purposeful proactive rounding/Room/bathroom lighting operational, light cord in reach

## 2025-03-10 NOTE — ED PROVIDER NOTE - NSFOLLOWUPINSTRUCTIONS_ED_ALL_ED_FT
Lumbar Strain  A lumbar strain, which is sometimes called a low-back strain, is a stretch or tear in a muscle or tendons in the lower back (lumbar spine). Tendons are the strong cords of tissue that attach muscle to bone. This type of injury occurs when muscles or tendons are torn or are stretched beyond their limits.    Lumbar strains can range from mild to severe. Mild strains may involve stretching a muscle or tendon without tearing it. These may heal in 1–2 weeks. More severe strains involve tearing of muscle fibers or tendons. These will cause more pain and may take 6–8 weeks to heal.    What are the causes?  This condition may be caused by:  Trauma, such as a fall or a hit to the body.  Twisting or overstretching the back. This may result from doing activities that take a lot of energy, such as lifting heavy objects.  What increases the risk?  A lumbar strain is more common in:  Athletes.  People with obesity.  People who do repeated lifting, bending, or other movements that involve their back.  What are the signs or symptoms?  Symptoms of this condition may include:  Sharp or dull pain in the lower back that does not go away. The pain may spread to the buttocks.  Stiffness or limited range of motion.  Sudden muscle tightening (spasms).  How is this diagnosed?  This condition may be diagnosed based on:  Your symptoms.  Your medical history.  A physical exam.  Imaging tests, such as:  X-rays.  MRI.  How is this treated?  Treatment for this condition may include:  Rest.  Applying heat and cold to the affected area.  Over-the-counter medicines to help with pain and inflammation, such as NSAIDs.  Prescription medicine for pain or to relax the muscles. These may be needed for a short time.  Physical therapy exercises to improve movement and strength.  Follow these instructions at home:  Managing pain, stiffness, and swelling    Bag of ice on a towel on the skin.  A heating pad for use on the affected area.  If told, put ice on the injured area during the first 24 hours after your injury.  Put ice in a plastic bag.  Place a towel between your skin and the bag.  Leave the ice on for 20 minutes, 2–3 times a day.  If told, apply heat to the affected area as often as told by your health care provider. Use the heat source that your health care provider recommends, such as a moist heat pack or a heating pad.  Place a towel between your skin and the heat source.  Leave the heat on for 20–30 minutes.  Remove the heat if your skin turns bright red. This is especially important if you are unable to feel pain, heat, or cold. You have a greater risk of getting burned.  If your skin turns bright red, remove the ice or heat right away to prevent skin damage. The risk of damage is higher if you cannot feel pain, heat, or cold.  Activity    Rest and return to your normal activities as told by your health care provider. Ask your health care provider what activities are safe for you.  Do exercises as told by your health care provider.  General instructions    Take over-the-counter and prescription medicines only as told by your health care provider.  Ask your health care provider if the medicine prescribed to you:  Requires you to avoid driving or using machinery.  Can cause constipation. You may need to take these actions to prevent or treat constipation:  Drink enough fluid to keep your urine pale yellow.  Take over-the-counter or prescription medicines.  Eat foods that are high in fiber, such as beans, whole grains, and fresh fruits and vegetables.  Limit foods that are high in fat and processed sugars, such as fried or sweet foods.  Do not use any products that contain nicotine or tobacco. These products include cigarettes, chewing tobacco, and vaping devices, such as e-cigarettes. If you need help quitting, ask your health care provider.  How is this prevented?  A person showing how to lift a heavy object. The correct way shows the person's knees bent.  To prevent a future low-back injury:  Always warm up properly before physical activity or sports.  Cool down and stretch after being active.  Use correct form when playing sports and lifting heavy objects. Bend your knees before you lift heavy objects.  Use good posture when sitting and standing.  Stay physically fit and keep a healthy weight.  Do at least 150 minutes of moderate intensity exercise each week, such as brisk walking or water aerobics.  Do strength exercises at least 2 times each week.  Contact a health care provider if:  Your back pain does not improve after several weeks of treatment.  Your symptoms get worse.  You have a fever.  Get help right away if:  Your back pain is severe.  You are unable to stand or walk.  You develop pain in your legs.  You have weakness in your buttocks or legs.  You have trouble controlling when you urinate or when you have a bowel movement.  You have frequent, painful, or bloody urination.  This information is not intended to replace advice given to you by your health care provider. Make sure you discuss any questions you have with your health care provider.    Motor Vehicle Collision Injury, Adult  After a motor vehicle collision, it is common to have injuries to the head, face, arms, and body. These injuries may include cuts, burns, and bruises. The collision can also cause sore muscles, muscle strains, headaches, and broken bones.    You may have stiffness and soreness for the first several hours. You may feel worse after waking up the first morning after the collision. These injuries tend to feel worse for the first 24–48 hours. Your injuries should then begin to improve with each day. How quickly you improve often depends on:  The severity of the collision.  The number of injuries you have.  The location and nature of the injuries.  Whether you were wearing a seat belt and whether your airbag deployed.  A head injury may result in a concussion, which is a brain injury that can have serious effects. If you have a concussion, you should rest as told by your health care provider. You must be very careful to avoid having a second concussion.    Follow these instructions at home:  Medicines    Take over-the-counter and prescription medicines only as told by your health care provider.  If you were prescribed antibiotics, take or apply it as told by your health care provider. Do not stop using the antibiotic even if you start to feel better.  Wound or burn care    Two wounds closed with skin glue. One is normal. The other is red with pus and infected.  Follow instructions from your health care provider about how to take care of your wound or burn. Make sure you:  Clean your wound or burn. To do this:  Wash it with mild soap and water.  Rinse it with water to remove all soap.  Pat it dry with a clean towel. Do not rub it.  Put an ointment or cream on the wound, if you were told to do so.  Know when and how to change or remove your bandage (dressing). Always wash your hands with soap and water for at least 20 seconds before and after you change your dressing. If soap and water are not available, use hand .  Leave any stitches (sutures), skin glue, or adhesive strips in place. These skin closures may need to stay in place for 2 weeks or longer. If adhesive strip edges start to loosen and curl up, you may trim the loose edges. Do not remove adhesive strips completely unless your health care provider tells you to do that.  Avoid exposing your burn or wound to the sun.  Keep the surface of the wound or burn intact.  Do not scratch or pick at the wound or burn.  Do not break any blisters you may have.  Do not peel any skin.  Check your wound or burn every day for signs of infection. Check for:  Redness, swelling, or pain.  Fluid or blood.  Warmth.  Pus or a bad smell.  Managing pain, stiffness, and swelling    Bag of ice on a towel on the skin.  If directed, put ice on the injured areas. This can help with pain and swelling. To do this:  Put ice in a plastic bag.  Place a towel between your skin and the bag.  Leave the ice on for 20 minutes, 2–3 times a day.  If your skin turns bright red, remove the ice right away to prevent skin damage. The risk of skin damage is higher if you cannot feel pain, heat, or cold.  Raise (elevate) the wound or burn above the level of your heart while you are sitting or lying down. This will help reduce pain, pressure, and swelling.  If you have a wound or burn on your face, you may want to sleep with your head elevated. You may do this by putting an extra pillow under your head.  Activity    Rest. Rest helps your body to heal. Make sure you:  Get plenty of sleep at night. Avoid staying up late.  Keep the same bedtime hours on weekends and weekdays.  You may have to avoid lifting. Ask your health care provider how much you can safely lift. Lifting can make neck or back pain worse.  Ask your health care provider when you can drive, ride a bicycle, or use machinery. Your ability to react may be slower if you injured your head. Do not do these activities if you are dizzy.  General instructions    If you have a splint, brace, or sling, follow your health care provider's instructions on how to use your device.  Drink enough fluid to keep your urine pale yellow.  Do not drink alcohol.  Eat a healthy diet. Ask your health care provider what foods you should eat.  Contact a health care provider if:  You have any new or worsening symptoms, such as:  A worsening headache  Pain or swelling in an arm or leg.  Numbness, tingling, or weakness in your arms or legs.  Trouble moving an arm or leg.  New neck or back pain.  Nausea or vomiting  You have signs of infection in a wound or burn.  You have a fever.  You have a head injury and any of the following symptoms for more than 2 weeks after your motor vehicle collision:  Headaches that do not go away.  Dizziness or balance problems.  Nausea or vomiting.  Increased sensitivity to noise or light.  Depression, anxiety, or irritability and mood swings.  Memory problems or trouble concentrating.  Sleep problems or feeling more tired than usual.  You have changes in bowel or bladder control.  You have blood in your urine, stool, or you vomit.  Get help right away if:  You have increasing pain in the chest, neck, back, or abdomen.  You have shortness of breath.  These symptoms may be an emergency. Get help right away. Call 911.  Do not wait to see if the symptoms will go away.  Do not drive yourself to the hospital.  This information is not intended to replace advice given to you by your health care provider. Make sure you discuss any questions you have with your health care provider.

## 2025-03-10 NOTE — ED PROVIDER NOTE - PATIENT PORTAL LINK FT
You can access the FollowMyHealth Patient Portal offered by [facility] by registering at the following website: [email or web address]. By joining [facility]’s FollowMyHealth portal, you will also be able to view your health information using other applications (apps) compatible with our system.
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
## 2025-03-10 NOTE — ED ADULT NURSE NOTE - NURSING MUSC ROM
"Right neck pain radiating down into shoulder and arm since Friday. Has tried ibuprofen and tylenol, heat and cold applications without relief. Similar episode 10 years ago resulting in an MRI and was told there was some degenerative issues.  Does do \"lifting\" and did so earlier on Friday but pain started 6 hours later. GCS 15. Ambulatory   "
full range of motion in all extremities

## 2025-03-10 NOTE — ED PROVIDER NOTE - PHYSICAL EXAMINATION
Gen: NAD. HEENT: NCAT, mmm   Chest: RRR, nl S1 and S2, no m/r/g. Resp: CTAB, no w/r/r  Abd: nl BS, soft, nt/nd. M/S: no midline tenderness. moderate right low back muscle spasm and tenderness. Ext: Warm, dry  Neuro: CN II-XII intact, normal and equal strength, sensation, and reflexes bilaterally, normal gait, speech clear  Psych: AAOx3
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
## 2025-03-12 DIAGNOSIS — S16.1XXA STRAIN OF MUSCLE, FASCIA AND TENDON AT NECK LEVEL, INITIAL ENCOUNTER: ICD-10-CM
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
## 2025-03-12 DIAGNOSIS — M54.50 LOW BACK PAIN, UNSPECIFIED: ICD-10-CM

## 2025-03-12 DIAGNOSIS — S39.012A STRAIN OF MUSCLE, FASCIA AND TENDON OF LOWER BACK, INITIAL ENCOUNTER: ICD-10-CM

## 2025-05-08 NOTE — ED ADULT NURSE NOTE - CAS TRG GEN SKIN COLOR
Virtual TCM Visit:Name: Tobin Kerns      : 1959      MRN: [id number]  Encounter Provider: Ann Carmona DO  Encounter Date: 2025   Encounter department: [facility]  :  Assessment & Plan  Quadriceps tendon rupture, right, sequela  s/p Right knee revision quad tendon repair with allograft augmentation   Home now from rehab and wearing knee brace  Weight bearing as tolerated, no issues with performing all ADL's  Denies any pain at this time.   Has follow up with Ortho on               History of Present Illness     Transitional Care Management Review:   Tobin Kerns is a 65 y.o. male here for TCM follow up.    During the TCM phone call patient stated:  TCM Call (since 2025)     Date and time call was made  2025  7:21 AM    Hospital care reviewed  Records reviewed    Patient was hospitialized at  [facility]    Date of Admission  25    Date of discharge  25    Diagnosis  Quadriceps muscle rupture, right, subsequent    Disposition  Home    Were the patients medications reviewed and updated  Yes    Current Symptoms  None      TCM Call (since 2025)     Post hospital issues  None    Scheduled for follow up?  Yes    Did you obtain your prescribed medications  Yes    Do you need help managing your prescriptions or medications  No    Is transportation to your appointment needed  No    I have advised the patient to call PCP with any new or worsening symptoms  Donna Baker MA    Living Arrangements  Family members        Patient presents today for TCM visit, admitted to the hospital between on  for repair of right tendon quadriceps-right knee revision quad tendon repair with allograft augmentation. Following surgery he was discharged to acute rehab for 8 weeks,  Hospital course primarily uncomplicated he did receive extensive PT and OT during his rehab course.  He is currently weightbearing as tolerated and wearing a   knee brace. He denies any pain at this time.        Review of Systems   Cardiovascular:  Negative for chest pain and palpitations.   Musculoskeletal:         Patient denies any leg pain   Neurological:  Negative for dizziness and headaches.     Objective   There were no vitals taken for this visit.    Physical Exam  Constitutional:       General: He is not in acute distress.  HENT:      Head: Normocephalic and atraumatic.   Eyes:      Extraocular Movements: Extraocular movements intact.   Pulmonary:      Effort: Pulmonary effort is normal.   Neurological:      Mental Status: He is alert.   Psychiatric:         Mood and Affect: Mood normal.         Behavior: Behavior normal.       Medications have been reviewed by provider in current encounter    Administrative Statements   Encounter provider Ann Carmona DO    The Patient is located at Home and in the following state in which I hold an active license [place].    The patient was identified by name and date of birth. Tobin Kerns was informed that this is a telemedicine visit and that the visit is being conducted through the Epic Embedded platform. He agrees to proceed..  My office door was closed. No one else was in the room.  He acknowledged consent and understanding of privacy and security of the video platform. The patient has agreed to participate and understands they can discontinue the visit at any time.    I have spent a total time of 15 minutes in caring for this patient on the day of the visit/encounter including Prognosis, Counseling / Coordination of care, and Documenting in the medical record, not including the time spent for establishing the audio/video connection.    Ann Carmona DO   Normal for race